# Patient Record
Sex: FEMALE | Race: WHITE | Employment: FULL TIME | URBAN - NONMETROPOLITAN AREA
[De-identification: names, ages, dates, MRNs, and addresses within clinical notes are randomized per-mention and may not be internally consistent; named-entity substitution may affect disease eponyms.]

---

## 2017-01-26 ENCOUNTER — TELEPHONE (OUTPATIENT)
Dept: CARDIOLOGY | Age: 47
End: 2017-01-26

## 2017-02-21 DIAGNOSIS — E78.5 HYPERLIPIDEMIA, UNSPECIFIED HYPERLIPIDEMIA TYPE: ICD-10-CM

## 2017-02-21 LAB
CHOLESTEROL, TOTAL: NORMAL MG/DL
CHOLESTEROL/HDL RATIO: NORMAL
HDLC SERPL-MCNC: NORMAL MG/DL (ref 35–70)
LDL CHOLESTEROL CALCULATED: 97 MG/DL (ref 0–160)
TRIGL SERPL-MCNC: NORMAL MG/DL
VLDLC SERPL CALC-MCNC: NORMAL MG/DL

## 2017-03-09 RX ORDER — ATORVASTATIN CALCIUM 40 MG/1
TABLET, FILM COATED ORAL
Qty: 30 TABLET | Refills: 4 | Status: SHIPPED | OUTPATIENT
Start: 2017-03-09 | End: 2017-11-08 | Stop reason: SDUPTHER

## 2017-03-10 ENCOUNTER — TELEPHONE (OUTPATIENT)
Dept: CARDIOLOGY | Age: 47
End: 2017-03-10

## 2017-09-26 RX ORDER — LISINOPRIL 2.5 MG/1
2.5 TABLET ORAL DAILY
Qty: 30 TABLET | Refills: 6 | Status: SHIPPED | OUTPATIENT
Start: 2017-09-26 | End: 2017-11-08 | Stop reason: SDUPTHER

## 2017-09-26 RX ORDER — METOPROLOL SUCCINATE 25 MG/1
25 TABLET, EXTENDED RELEASE ORAL DAILY
Qty: 30 TABLET | Refills: 6 | Status: SHIPPED | OUTPATIENT
Start: 2017-09-26 | End: 2017-11-08 | Stop reason: SDUPTHER

## 2017-11-08 ENCOUNTER — OFFICE VISIT (OUTPATIENT)
Dept: CARDIOLOGY CLINIC | Age: 47
End: 2017-11-08
Payer: COMMERCIAL

## 2017-11-08 VITALS
HEART RATE: 90 BPM | BODY MASS INDEX: 28.32 KG/M2 | WEIGHT: 150 LBS | SYSTOLIC BLOOD PRESSURE: 104 MMHG | DIASTOLIC BLOOD PRESSURE: 72 MMHG | HEIGHT: 61 IN

## 2017-11-08 DIAGNOSIS — I10 ESSENTIAL HYPERTENSION: ICD-10-CM

## 2017-11-08 DIAGNOSIS — I47.1 SVT (SUPRAVENTRICULAR TACHYCARDIA) (HCC): Primary | Chronic | ICD-10-CM

## 2017-11-08 DIAGNOSIS — I42.0 DILATED CARDIOMYOPATHY (HCC): ICD-10-CM

## 2017-11-08 DIAGNOSIS — E78.01 FAMILIAL HYPERCHOLESTEROLEMIA: ICD-10-CM

## 2017-11-08 PROCEDURE — 93000 ELECTROCARDIOGRAM COMPLETE: CPT | Performed by: NUCLEAR MEDICINE

## 2017-11-08 PROCEDURE — G8427 DOCREV CUR MEDS BY ELIG CLIN: HCPCS | Performed by: NUCLEAR MEDICINE

## 2017-11-08 PROCEDURE — G8419 CALC BMI OUT NRM PARAM NOF/U: HCPCS | Performed by: NUCLEAR MEDICINE

## 2017-11-08 PROCEDURE — 99213 OFFICE O/P EST LOW 20 MIN: CPT | Performed by: NUCLEAR MEDICINE

## 2017-11-08 PROCEDURE — 4004F PT TOBACCO SCREEN RCVD TLK: CPT | Performed by: NUCLEAR MEDICINE

## 2017-11-08 PROCEDURE — G8484 FLU IMMUNIZE NO ADMIN: HCPCS | Performed by: NUCLEAR MEDICINE

## 2017-11-08 RX ORDER — LISINOPRIL 2.5 MG/1
2.5 TABLET ORAL DAILY
Qty: 30 TABLET | Refills: 11 | Status: SHIPPED | OUTPATIENT
Start: 2017-11-08 | End: 2018-11-05 | Stop reason: SDUPTHER

## 2017-11-08 RX ORDER — ATORVASTATIN CALCIUM 40 MG/1
TABLET, FILM COATED ORAL
Qty: 30 TABLET | Refills: 11 | Status: SHIPPED | OUTPATIENT
Start: 2017-11-08 | End: 2018-11-05 | Stop reason: SDUPTHER

## 2017-11-08 RX ORDER — METOPROLOL SUCCINATE 25 MG/1
25 TABLET, EXTENDED RELEASE ORAL DAILY
Qty: 30 TABLET | Refills: 11 | Status: SHIPPED | OUTPATIENT
Start: 2017-11-08 | End: 2018-11-05 | Stop reason: SDUPTHER

## 2017-11-08 NOTE — PROGRESS NOTES
[facility] [address] [address]  Dept: [phone]  Dept Fax: [phone]  Loc: [phone]    Visit Date: 11/8/2017    Suman Blankenship is a 49 y.o. female who presents today for:  Chief Complaint   Patient presents with   Baron Rom     last seen in 9-2016, SVT    Cardiomyopathy    Hypertension    Hyperlipidemia     Cardiomyopathy  No new symptoms  BP is stable   Back to smoking   No dizziness  No syncope  No SVt episodes      HPI:  HPI  Past Medical History:   Diagnosis Date    Depression     DUB (dysfunctional uterine bleeding) 11/6/2013    Hyperlipidemia     Nicotine dependence     Palpitations     Snores     SVT (supraventricular tachycardia) ([facility] [place] [address]       Past Surgical History:   Procedure Laterality Date    CARDIOVASCULAR STRESS TEST  2 04 2011    Patient activated transmissions notable for an episode suggestive of re-entry  SVT w/ heart rates up to 190's. Remaining transmissions appeared consistent w/ either sinus rhythm or sinus tachycardia. Suki, 2001, 2004    X 3    ENDOMETRIAL ABLATION  11/6/13    PO [place]      TRANSTHORACIC ECHOCARDIOGRAM  2 04 2011    LV was mildly dilated. Systolic function mildly reduced. EF 45%. Mild diffuse hypokinesis.  Mild MR and TR.     TUBAL LIGATION  2004     Family History   Problem Relation Age of Onset    Cancer Mother     Hypertension Mother     Diabetes Father     Heart Disease Father     High Cholesterol Father     Heart Surgery Father      Bypass/surgery     Social History   Substance Use Topics    Smoking status: Current Every Day Smoker     Packs/day: 1.00     Years: 30.00     Types: Cigarettes    Smokeless tobacco: Never Used    Alcohol use No      Current Outpatient Prescriptions   Medication Sig Dispense Refill    lisinopril (PRINIVIL;ZESTRIL) 2.5 MG tablet Take 1 tablet by mouth daily 30 tablet 6    metoprolol succinate (TOPROL XL) 25 MG extended release tablet Take 1 tablet by mouth daily 30 tablet 6    atorvastatin (LIPITOR) 40 MG tablet TAKE 1 TABLET BY MOUTH ONE TIME A DAY  30 tablet 4     No current facility-administered medications for this visit. No Known Allergies  Health Maintenance   Topic Date Due    HIV screen  03/11/1984    DTaP/Tdap/Td vaccine (1 - Tdap) 03/11/1988    Pneumococcal med risk (1 of 1 - PPSV23) 03/11/1988    Cervical cancer screen  03/11/1990    Diabetes screen  03/11/2009    Flu vaccine (1) 09/01/2017    Lipid screen  02/20/2022       Subjective:  Review of Systems  General:   No fever, no chills, No fatigue or weight loss  Pulmonary:    No dyspnea, no wheezing  Cardiac:    Denies recent chest pain,   GI:     No nausea or vomiting, no abdominal pain  Neuro:    No dizziness or light headedness,   Musculoskeletal:  No recent active issues  Extremities:   No edema, good peripheral pulses      Objective:  Physical Exam  /72   Pulse 90   Ht 5' 1\" (1.549 m)   Wt 150 lb (68 kg)   BMI 28.34 kg/m²   General:   Well developed, well nourished  Lungs:   Clear to auscultation  Heart:    Normal S1 S2, Slight murmur. no rubs, no gallops  Abdomen:   Soft, non tender, no organomegalies, positive bowel sounds  Extremities:   No edema, no cyanosis, good peripheral pulses  Neurological:   Awake, alert, oriented. No obvious focal deficits  Musculoskelatal:  No obvious deformities    Assessment:     1. SVT (supraventricular tachycardia) (HCC)  EKG 12 Lead   2. Essential hypertension     3. Familial hypercholesterolemia     4. Dilated cardiomyopathy ([address]     cardiac fair for now  ECG in office was done today. I reviewed the ECG. No acute findings      Plan:  No Follow-up on file. Consider an echo   Continue risk factor modification and medical management  Thank you for allowing me to participate in the care of your patient.  Please don't hesitate to contact me regarding any further issues related to the patient

## 2017-11-08 NOTE — PROGRESS NOTES
Pt here has not been seen since 9-2016    Denies chest pain, sob, dizziness, edema, and palpitations

## 2018-03-23 ENCOUNTER — OFFICE VISIT (OUTPATIENT)
Dept: FAMILY MEDICINE CLINIC | Age: 48
End: 2018-03-23

## 2018-03-23 VITALS
RESPIRATION RATE: 14 BRPM | SYSTOLIC BLOOD PRESSURE: 112 MMHG | HEIGHT: 61 IN | BODY MASS INDEX: 28.55 KG/M2 | WEIGHT: 151.2 LBS | HEART RATE: 84 BPM | DIASTOLIC BLOOD PRESSURE: 70 MMHG

## 2018-03-23 DIAGNOSIS — I47.1 SVT (SUPRAVENTRICULAR TACHYCARDIA) (HCC): ICD-10-CM

## 2018-03-23 DIAGNOSIS — E78.01 FAMILIAL HYPERCHOLESTEROLEMIA: ICD-10-CM

## 2018-03-23 DIAGNOSIS — I10 ESSENTIAL HYPERTENSION: Primary | ICD-10-CM

## 2018-03-23 PROCEDURE — 99396 PREV VISIT EST AGE 40-64: CPT | Performed by: FAMILY MEDICINE

## 2018-03-23 ASSESSMENT — ENCOUNTER SYMPTOMS
CHEST TIGHTNESS: 0
SHORTNESS OF BREATH: 0
NAUSEA: 0
CONSTIPATION: 0
DIARRHEA: 0
ABDOMINAL PAIN: 0
COUGH: 0
EYES NEGATIVE: 1
VOMITING: 0

## 2018-03-23 ASSESSMENT — PATIENT HEALTH QUESTIONNAIRE - PHQ9
SUM OF ALL RESPONSES TO PHQ9 QUESTIONS 1 & 2: 0
SUM OF ALL RESPONSES TO PHQ QUESTIONS 1-9: 0
1. LITTLE INTEREST OR PLEASURE IN DOING THINGS: 0

## 2018-09-25 ENCOUNTER — TELEPHONE (OUTPATIENT)
Dept: CARDIOLOGY CLINIC | Age: 48
End: 2018-09-25

## 2018-09-25 DIAGNOSIS — R00.2 PALPITATIONS: Chronic | ICD-10-CM

## 2018-09-25 DIAGNOSIS — I47.1 SVT (SUPRAVENTRICULAR TACHYCARDIA) (HCC): Primary | Chronic | ICD-10-CM

## 2018-10-29 ENCOUNTER — HOSPITAL ENCOUNTER (OUTPATIENT)
Dept: NON INVASIVE DIAGNOSTICS | Age: 48
Discharge: HOME | End: 2018-10-29
Payer: COMMERCIAL

## 2018-10-29 LAB
LV EF: 53 %
LVEF MODALITY: NORMAL

## 2018-10-29 PROCEDURE — 93306 TTE W/DOPPLER COMPLETE: CPT

## 2018-11-05 ENCOUNTER — OFFICE VISIT (OUTPATIENT)
Dept: CARDIOLOGY CLINIC | Age: 48
End: 2018-11-05
Payer: COMMERCIAL

## 2018-11-05 VITALS
DIASTOLIC BLOOD PRESSURE: 68 MMHG | HEART RATE: 107 BPM | BODY MASS INDEX: 28.89 KG/M2 | WEIGHT: 153 LBS | HEIGHT: 61 IN | SYSTOLIC BLOOD PRESSURE: 102 MMHG

## 2018-11-05 DIAGNOSIS — F17.200 SMOKING: ICD-10-CM

## 2018-11-05 DIAGNOSIS — I47.1 SVT (SUPRAVENTRICULAR TACHYCARDIA) (HCC): Primary | Chronic | ICD-10-CM

## 2018-11-05 DIAGNOSIS — E78.5 HYPERLIPIDEMIA, UNSPECIFIED HYPERLIPIDEMIA TYPE: Chronic | ICD-10-CM

## 2018-11-05 DIAGNOSIS — I42.0 DILATED CARDIOMYOPATHY (HCC): ICD-10-CM

## 2018-11-05 PROCEDURE — G8484 FLU IMMUNIZE NO ADMIN: HCPCS | Performed by: NUCLEAR MEDICINE

## 2018-11-05 PROCEDURE — G8427 DOCREV CUR MEDS BY ELIG CLIN: HCPCS | Performed by: NUCLEAR MEDICINE

## 2018-11-05 PROCEDURE — 4004F PT TOBACCO SCREEN RCVD TLK: CPT | Performed by: NUCLEAR MEDICINE

## 2018-11-05 PROCEDURE — 99213 OFFICE O/P EST LOW 20 MIN: CPT | Performed by: NUCLEAR MEDICINE

## 2018-11-05 PROCEDURE — 93000 ELECTROCARDIOGRAM COMPLETE: CPT | Performed by: NUCLEAR MEDICINE

## 2018-11-05 PROCEDURE — G8419 CALC BMI OUT NRM PARAM NOF/U: HCPCS | Performed by: NUCLEAR MEDICINE

## 2018-11-05 RX ORDER — LISINOPRIL 2.5 MG/1
2.5 TABLET ORAL DAILY
Qty: 30 TABLET | Refills: 11 | Status: SHIPPED | OUTPATIENT
Start: 2018-11-05 | End: 2019-11-16 | Stop reason: SDUPTHER

## 2018-11-05 RX ORDER — ATORVASTATIN CALCIUM 40 MG/1
TABLET, FILM COATED ORAL
Qty: 30 TABLET | Refills: 11 | Status: SHIPPED | OUTPATIENT
Start: 2018-11-05 | End: 2019-11-16 | Stop reason: SDUPTHER

## 2018-11-05 RX ORDER — METOPROLOL SUCCINATE 25 MG/1
25 TABLET, EXTENDED RELEASE ORAL DAILY
Qty: 30 TABLET | Refills: 11 | Status: SHIPPED | OUTPATIENT
Start: 2018-11-05 | End: 2019-11-16 | Stop reason: SDUPTHER

## 2018-11-05 NOTE — PROGRESS NOTES
[address] [facility] CARDIOLOGY  [facility] [address]  [place] [address]  Dept: [phone]  Dept Fax: [phone]  Loc: [phone]    Visit Date: 11/5/2018    Dennys Blas is a 51 y.o. female who presents todayfor:  Chief Complaint   Patient presents with    Check-Up    Cardiomyopathy    Nicotine Dependence    Tachycardia    Hyperlipidemia   improved CMP   Last echo Ef 55%  No chest pain  No changes in breathing  Still smoking   On stations  SVt is stable         HPI:  HPI  Past Medical History:   Diagnosis Date    Depression     DUB (dysfunctional uterine bleeding) 11/6/2013    Hyperlipidemia     Nicotine dependence     Palpitations     Snores     SVT (supraventricular tachycardia) ([facility] [address]       Past Surgical History:   Procedure Laterality Date    CARDIOVASCULAR STRESS TEST  2 04 2011    Patient activated transmissions notable for an episode suggestive of re-entry  SVT w/ heart rates up to 190's. Remaining transmissions appeared consistent w/ either sinus rhythm or sinus tachycardia. Suki, 2001, 2004    X 3    ENDOMETRIAL ABLATION  11/6/13    PO [place]ANKLIN      TRANSTHORACIC ECHOCARDIOGRAM  2 04 2011    LV was mildly dilated. Systolic function mildly reduced. EF 45%. Mild diffuse hypokinesis.  Mild MR and TR.     TUBAL LIGATION  2004     Family History   Problem Relation Age of Onset    Cancer Mother     Hypertension Mother     Diabetes Father     Heart Disease Father     High Cholesterol Father     Heart Surgery Father         Bypass/surgery     Social History   Substance Use Topics    Smoking status: Current Every Day Smoker     Packs/day: 1.00     Years: 30.00     Types: Cigarettes    Smokeless tobacco: Never Used    Alcohol use No      Current Outpatient Prescriptions   Medication Sig Dispense Refill    lisinopril (PRINIVIL;ZESTRIL) 2.5 MG tablet Take 1 tablet by mouth daily 30 tablet 11    metoprolol succinate (TOPROL XL) 25 MG extended release tablet Take 1 tablet by mouth daily 30 tablet 11    atorvastatin (LIPITOR) 40 MG tablet TAKE 1 TABLET BY MOUTH ONE TIME A DAY 30 tablet 11     No current facility-administered medications for this visit. No Known Allergies  Health Maintenance   Topic Date Due    Potassium monitoring  1969    Creatinine monitoring  1969    HIV screen  03/11/1984    DTaP/Tdap/Td vaccine (1 - Tdap) 03/11/1988    Pneumococcal med risk (1 of 1 - PPSV23) 03/11/1988    Cervical cancer screen  03/11/1990    Diabetes screen  03/11/2009    Flu vaccine (1) 09/01/2018    Lipid screen  02/20/2022       Subjective:  Review of Systems  General:   No fever, no chills, No fatigue or weight loss  Pulmonary:    No dyspnea, no wheezing  Cardiac:    Denies recent chest pain,   GI:     No nausea or vomiting, no abdominal pain  Neuro:     No dizziness or light headedness,   Musculoskeletal:  No recent active issues  Extremities:   No edema, good peripheral pulses      Objective:  Physical Exam  /68   Pulse 107   Ht 5' 1\" (1.549 m)   Wt 153 lb (69.4 kg)   BMI 28.91 kg/m²   General:   Well developed, well nourished  Lungs:    Clear to auscultation  Heart:    Normal S1 S2, Slight murmur. no rubs, no gallops  Abdomen:   Soft, non tender, no organomegalies, positive bowel sounds  Extremities:   No edema, no cyanosis, good peripheral pulses  Neurological:   Awake, alert, oriented. No obvious focal deficits  Musculoskelatal:  No obvious deformities    Assessment:      Diagnosis Orders   1. SVT (supraventricular tachycardia) (HCC)  EKG 12 lead   2. Hyperlipidemia, unspecified hyperlipidemia type  EKG 12 lead   3. Dilated cardiomyopathy ([address]     4. Smoking     cardiac fair for now   Need to watch the HR  ECG in office was done today. I reviewed the ECG. No acute findings      Plan:  No Follow-up on file.   As above  Exercise  Continue risk factor modification and medical management  Thank you

## 2018-11-19 ENCOUNTER — TELEPHONE (OUTPATIENT)
Dept: FAMILY MEDICINE CLINIC | Age: 48
End: 2018-11-19

## 2019-08-26 ENCOUNTER — OFFICE VISIT (OUTPATIENT)
Dept: FAMILY MEDICINE CLINIC | Age: 49
End: 2019-08-26

## 2019-08-26 ENCOUNTER — HOSPITAL ENCOUNTER (OUTPATIENT)
Dept: WOMENS IMAGING | Age: 49
Discharge: HOME | End: 2019-08-26
Payer: COMMERCIAL

## 2019-08-26 VITALS
BODY MASS INDEX: 29.83 KG/M2 | DIASTOLIC BLOOD PRESSURE: 74 MMHG | SYSTOLIC BLOOD PRESSURE: 120 MMHG | HEIGHT: 61 IN | RESPIRATION RATE: 16 BRPM | HEART RATE: 72 BPM | WEIGHT: 158 LBS

## 2019-08-26 DIAGNOSIS — K59.00 CONSTIPATION, UNSPECIFIED CONSTIPATION TYPE: ICD-10-CM

## 2019-08-26 DIAGNOSIS — Z12.31 VISIT FOR SCREENING MAMMOGRAM: ICD-10-CM

## 2019-08-26 DIAGNOSIS — I47.1 SVT (SUPRAVENTRICULAR TACHYCARDIA) (HCC): ICD-10-CM

## 2019-08-26 DIAGNOSIS — I10 ESSENTIAL HYPERTENSION: Primary | ICD-10-CM

## 2019-08-26 DIAGNOSIS — E78.5 HYPERLIPIDEMIA, UNSPECIFIED HYPERLIPIDEMIA TYPE: ICD-10-CM

## 2019-08-26 LAB
HEMOCCULT STL QL: NEGATIVE
HEMOCCULT STL QL: NORMAL
HEMOCCULT STL QL: NORMAL

## 2019-08-26 PROCEDURE — 1036F TOBACCO NON-USER: CPT | Performed by: FAMILY MEDICINE

## 2019-08-26 PROCEDURE — 77063 BREAST TOMOSYNTHESIS BI: CPT

## 2019-08-26 PROCEDURE — G8419 CALC BMI OUT NRM PARAM NOF/U: HCPCS | Performed by: FAMILY MEDICINE

## 2019-08-26 PROCEDURE — 3017F COLORECTAL CA SCREEN DOC REV: CPT | Performed by: FAMILY MEDICINE

## 2019-08-26 PROCEDURE — 82270 OCCULT BLOOD FECES: CPT | Performed by: FAMILY MEDICINE

## 2019-08-26 PROCEDURE — G8427 DOCREV CUR MEDS BY ELIG CLIN: HCPCS | Performed by: FAMILY MEDICINE

## 2019-08-26 PROCEDURE — 99214 OFFICE O/P EST MOD 30 MIN: CPT | Performed by: FAMILY MEDICINE

## 2019-08-26 ASSESSMENT — ENCOUNTER SYMPTOMS
SHORTNESS OF BREATH: 0
ABDOMINAL PAIN: 0
COUGH: 0
CHEST TIGHTNESS: 0
CONSTIPATION: 1
NAUSEA: 0
DIARRHEA: 0
VOMITING: 0
EYES NEGATIVE: 1

## 2019-08-26 NOTE — PROGRESS NOTES
Father     Heart Surgery Father         Bypass/surgery     Subjective:     Review of Systems   Constitutional: Negative for activity change, appetite change, diaphoresis, fatigue and fever. HENT: Negative. Eyes: Negative. Respiratory: Negative for cough, chest tightness and shortness of breath. Cardiovascular: Negative for chest pain, palpitations and leg swelling. Gastrointestinal: Positive for constipation. Negative for abdominal pain, diarrhea, nausea and vomiting. Genitourinary: Negative. Musculoskeletal: Negative. Skin: Negative. Negative for rash. Neurological: Negative for dizziness, syncope, weakness, light-headedness, numbness and headaches. Psychiatric/Behavioral: Negative.        :   /74 (Site: Left Upper Arm, Position: Sitting, Cuff Size: Medium Adult)   Pulse 72   Resp 16   Ht 5' 1\" (1.549 m)   Wt 158 lb (71.7 kg)   BMI 29.85 kg/m²   Wt Readings from Last 3 Encounters:   08/26/19 158 lb (71.7 kg)   11/05/18 153 lb (69.4 kg)   03/23/18 151 lb 3.2 oz (68.6 kg)     Physical Exam   Constitutional: She is oriented to person, place, and time. She appears well-developed and well-nourished. No distress. HENT:   Head: Normocephalic and atraumatic. Eyes: Pupils are equal, round, and reactive to light. Conjunctivae and EOM are normal. Right eye exhibits no discharge. Left eye exhibits no discharge. No scleral icterus. Neck: Normal range of motion. Neck supple. No JVD present. No thyromegaly present. Cardiovascular: Normal rate, regular rhythm and normal heart sounds. No murmur heard. Pulmonary/Chest: Breath sounds normal. No respiratory distress. She has no wheezes. She has no rhonchi. She has no rales. Abdominal: Soft. Bowel sounds are normal. She exhibits no distension and no mass. There is no hepatosplenomegaly. There is no rebound and no guarding. Area that she gets the tenderness when increased intraabdominal pressure.  Possible hernia but not well

## 2019-08-27 LAB
CHOLESTEROL, TOTAL: NORMAL MG/DL
CHOLESTEROL/HDL RATIO: NORMAL
HDLC SERPL-MCNC: NORMAL MG/DL (ref 35–70)
LDL CHOLESTEROL CALCULATED: 100 MG/DL (ref 0–160)
TRIGL SERPL-MCNC: NORMAL MG/DL
VLDLC SERPL CALC-MCNC: NORMAL MG/DL

## 2019-08-29 DIAGNOSIS — I10 ESSENTIAL HYPERTENSION: ICD-10-CM

## 2019-08-29 DIAGNOSIS — E78.5 HYPERLIPIDEMIA, UNSPECIFIED HYPERLIPIDEMIA TYPE: ICD-10-CM

## 2019-10-08 ENCOUNTER — TELEPHONE (OUTPATIENT)
Dept: FAMILY MEDICINE CLINIC | Age: 49
End: 2019-10-08

## 2019-10-08 RX ORDER — AMOXICILLIN 500 MG/1
500 CAPSULE ORAL 3 TIMES DAILY
Qty: 30 CAPSULE | Refills: 0 | Status: SHIPPED | OUTPATIENT
Start: 2019-10-08 | End: 2019-10-18

## 2019-10-28 ENCOUNTER — OFFICE VISIT (OUTPATIENT)
Dept: FAMILY MEDICINE CLINIC | Age: 49
End: 2019-10-28

## 2019-10-28 VITALS
HEIGHT: 61 IN | DIASTOLIC BLOOD PRESSURE: 70 MMHG | RESPIRATION RATE: 12 BRPM | BODY MASS INDEX: 29.85 KG/M2 | SYSTOLIC BLOOD PRESSURE: 120 MMHG | HEART RATE: 72 BPM

## 2019-10-28 DIAGNOSIS — E78.5 HYPERLIPIDEMIA, UNSPECIFIED HYPERLIPIDEMIA TYPE: ICD-10-CM

## 2019-10-28 DIAGNOSIS — R43.2 DYSGEUSIA: ICD-10-CM

## 2019-10-28 DIAGNOSIS — I10 ESSENTIAL HYPERTENSION: Primary | ICD-10-CM

## 2019-10-28 PROCEDURE — 1036F TOBACCO NON-USER: CPT | Performed by: FAMILY MEDICINE

## 2019-10-28 PROCEDURE — G8419 CALC BMI OUT NRM PARAM NOF/U: HCPCS | Performed by: FAMILY MEDICINE

## 2019-10-28 PROCEDURE — 3017F COLORECTAL CA SCREEN DOC REV: CPT | Performed by: FAMILY MEDICINE

## 2019-10-28 PROCEDURE — G8427 DOCREV CUR MEDS BY ELIG CLIN: HCPCS | Performed by: FAMILY MEDICINE

## 2019-10-28 PROCEDURE — 99213 OFFICE O/P EST LOW 20 MIN: CPT | Performed by: FAMILY MEDICINE

## 2019-10-28 ASSESSMENT — ENCOUNTER SYMPTOMS
CHEST TIGHTNESS: 0
EYES NEGATIVE: 1
COUGH: 0
DIARRHEA: 0
CONSTIPATION: 0
NAUSEA: 0
ABDOMINAL PAIN: 0
VOMITING: 0
SHORTNESS OF BREATH: 0

## 2019-10-29 LAB
ABSOLUTE BASO #: 0.1 X10E9/L (ref 0–0.9)
ABSOLUTE EOS #: 0.1 X10E9/L (ref 0–0.4)
ABSOLUTE LYMPH #: 2 X10E9/L (ref 1–3.5)
ABSOLUTE MONO #: 0.5 X10E9/L (ref 0–0.9)
ABSOLUTE NEUT #: 3.4 X10E9/L (ref 1.5–6.6)
ALBUMIN SERPL-MCNC: 4.5 G/DL (ref 3.2–5.3)
ALK PHOSPHATASE: 63 U/L (ref 39–130)
ALT SERPL-CCNC: 14 U/L (ref 0–31)
ANION GAP SERPL CALCULATED.3IONS-SCNC: 8 MMOL/L (ref 4–12)
ANTI-NUCLEAR ANTIBODY (ANA): NEGATIVE
AST SERPL-CCNC: 15 U/L (ref 0–41)
BASOPHILS RELATIVE PERCENT: 1 %
BILIRUB SERPL-MCNC: 0.4 MG/DL (ref 0.3–1.2)
BUN BLDV-MCNC: 11 MG/DL (ref 5–23)
CALCIUM SERPL-MCNC: 9.9 MG/DL (ref 8.5–10.5)
CHLORIDE BLD-SCNC: 105 MMOL/L (ref 98–109)
CO2: 29 MMOL/L (ref 22–32)
CREAT SERPL-MCNC: 0.88 MG/DL (ref 0.4–1)
EGFR AFRICAN AMERICAN: >60 ML/MIN/1.73SQ.M
EGFR IF NONAFRICAN AMERICAN: >60 ML/MIN/1.73SQ.M
EOSINOPHILS RELATIVE PERCENT: 2.2 %
FOLATE: 14.6 NG/ML
GLUCOSE: 92 MG/DL (ref 65–99)
HCT VFR BLD CALC: 43.7 % (ref 35–47)
HEMOGLOBIN: 14.4 G/DL (ref 11.7–16)
LYMPHOCYTE %: 32 %
MCH RBC QN AUTO: 29.8 PG (ref 26–33.5)
MCHC RBC AUTO-ENTMCNC: 32.9 G/DL (ref 32–36)
MCV RBC AUTO: 90 FL (ref 81–100)
MONOCYTES # BLD: 9 %
NEUTROPHILS RELATIVE PERCENT: 55.8 %
PDW BLD-RTO: 14 % (ref 11.5–14.7)
PLATELETS: 283 X10E9/L (ref 150–450)
PMV BLD AUTO: 7.6 FL (ref 7–12)
POTASSIUM SERPL-SCNC: 4.4 MMOL/L (ref 3.5–5)
RBC: 4.83 X10E12/L (ref 3.8–5.2)
RHEUMATOID FACTOR: <10 IU/ML
SEDIMENTATION RATE, ERYTHROCYTE: 8 MM/H (ref 0–30)
SODIUM BLD-SCNC: 142 MMOL/L (ref 134–146)
TOTAL PROTEIN: 6.8 G/DL (ref 6–8)
TSH SERPL DL<=0.05 MIU/L-ACNC: 1.92 UIU/ML (ref 0.49–4.67)
VITAMIN B-12: 303 PG/ML (ref 180–914)
WBC: 6.1 X10E9/L (ref 4.8–10.8)

## 2019-11-08 ENCOUNTER — TELEPHONE (OUTPATIENT)
Dept: FAMILY MEDICINE CLINIC | Age: 49
End: 2019-11-08

## 2019-11-08 ENCOUNTER — OFFICE VISIT (OUTPATIENT)
Dept: FAMILY MEDICINE CLINIC | Age: 49
End: 2019-11-08

## 2019-11-08 VITALS
SYSTOLIC BLOOD PRESSURE: 116 MMHG | BODY MASS INDEX: 30.73 KG/M2 | DIASTOLIC BLOOD PRESSURE: 70 MMHG | RESPIRATION RATE: 16 BRPM | HEIGHT: 61 IN | WEIGHT: 162.8 LBS | HEART RATE: 58 BPM | TEMPERATURE: 98.2 F

## 2019-11-08 DIAGNOSIS — R43.2 DYSGEUSIA: ICD-10-CM

## 2019-11-08 DIAGNOSIS — H92.03: ICD-10-CM

## 2019-11-08 DIAGNOSIS — I10 ESSENTIAL HYPERTENSION: Primary | ICD-10-CM

## 2019-11-08 PROCEDURE — G8427 DOCREV CUR MEDS BY ELIG CLIN: HCPCS | Performed by: FAMILY MEDICINE

## 2019-11-08 PROCEDURE — 3017F COLORECTAL CA SCREEN DOC REV: CPT | Performed by: FAMILY MEDICINE

## 2019-11-08 PROCEDURE — G8417 CALC BMI ABV UP PARAM F/U: HCPCS | Performed by: FAMILY MEDICINE

## 2019-11-08 PROCEDURE — 99214 OFFICE O/P EST MOD 30 MIN: CPT | Performed by: FAMILY MEDICINE

## 2019-11-08 PROCEDURE — 1036F TOBACCO NON-USER: CPT | Performed by: FAMILY MEDICINE

## 2019-11-08 RX ORDER — OMEPRAZOLE 40 MG/1
CAPSULE, DELAYED RELEASE ORAL
Refills: 2 | COMMUNITY
Start: 2019-10-28 | End: 2022-02-16

## 2019-11-08 RX ORDER — AMOXICILLIN AND CLAVULANATE POTASSIUM 875; 125 MG/1; MG/1
1 TABLET, FILM COATED ORAL 2 TIMES DAILY
Qty: 20 TABLET | Refills: 0 | Status: SHIPPED | OUTPATIENT
Start: 2019-11-08 | End: 2019-11-18

## 2019-11-10 ASSESSMENT — ENCOUNTER SYMPTOMS
CONSTIPATION: 0
VOMITING: 0
DIARRHEA: 0
ABDOMINAL PAIN: 0
COUGH: 0
SHORTNESS OF BREATH: 0
NAUSEA: 0
CHEST TIGHTNESS: 0
EYES NEGATIVE: 1

## 2019-11-11 ENCOUNTER — OFFICE VISIT (OUTPATIENT)
Dept: CARDIOLOGY CLINIC | Age: 49
End: 2019-11-11
Payer: COMMERCIAL

## 2019-11-11 VITALS
WEIGHT: 162 LBS | DIASTOLIC BLOOD PRESSURE: 68 MMHG | SYSTOLIC BLOOD PRESSURE: 122 MMHG | BODY MASS INDEX: 30.58 KG/M2 | HEIGHT: 61 IN | HEART RATE: 60 BPM

## 2019-11-11 DIAGNOSIS — I42.0 DILATED CARDIOMYOPATHY (HCC): ICD-10-CM

## 2019-11-11 DIAGNOSIS — I10 ESSENTIAL HYPERTENSION: ICD-10-CM

## 2019-11-11 DIAGNOSIS — E78.01 FAMILIAL HYPERCHOLESTEROLEMIA: ICD-10-CM

## 2019-11-11 DIAGNOSIS — I47.1 SVT (SUPRAVENTRICULAR TACHYCARDIA) (HCC): Primary | ICD-10-CM

## 2019-11-11 PROCEDURE — G8417 CALC BMI ABV UP PARAM F/U: HCPCS | Performed by: NUCLEAR MEDICINE

## 2019-11-11 PROCEDURE — 99213 OFFICE O/P EST LOW 20 MIN: CPT | Performed by: NUCLEAR MEDICINE

## 2019-11-11 PROCEDURE — G8427 DOCREV CUR MEDS BY ELIG CLIN: HCPCS | Performed by: NUCLEAR MEDICINE

## 2019-11-11 PROCEDURE — 1036F TOBACCO NON-USER: CPT | Performed by: NUCLEAR MEDICINE

## 2019-11-11 PROCEDURE — 3017F COLORECTAL CA SCREEN DOC REV: CPT | Performed by: NUCLEAR MEDICINE

## 2019-11-11 PROCEDURE — 93000 ELECTROCARDIOGRAM COMPLETE: CPT | Performed by: NUCLEAR MEDICINE

## 2019-11-11 PROCEDURE — G8482 FLU IMMUNIZE ORDER/ADMIN: HCPCS | Performed by: NUCLEAR MEDICINE

## 2019-11-12 ENCOUNTER — TELEPHONE (OUTPATIENT)
Dept: FAMILY MEDICINE CLINIC | Age: 49
End: 2019-11-12

## 2019-11-18 RX ORDER — ATORVASTATIN CALCIUM 40 MG/1
TABLET, FILM COATED ORAL
Qty: 30 TABLET | Refills: 10 | Status: SHIPPED | OUTPATIENT
Start: 2019-11-18 | End: 2020-12-03

## 2019-11-18 RX ORDER — METOPROLOL SUCCINATE 25 MG/1
TABLET, EXTENDED RELEASE ORAL
Qty: 30 TABLET | Refills: 10 | Status: SHIPPED | OUTPATIENT
Start: 2019-11-18 | End: 2020-12-03

## 2019-11-18 RX ORDER — LISINOPRIL 2.5 MG/1
TABLET ORAL
Qty: 30 TABLET | Refills: 10 | Status: SHIPPED | OUTPATIENT
Start: 2019-11-18 | End: 2020-12-03

## 2019-11-26 ENCOUNTER — TELEPHONE (OUTPATIENT)
Dept: ENT CLINIC | Age: 49
End: 2019-11-26

## 2019-12-12 ENCOUNTER — TELEPHONE (OUTPATIENT)
Dept: FAMILY MEDICINE CLINIC | Age: 49
End: 2019-12-12

## 2019-12-19 ENCOUNTER — APPOINTMENT (OUTPATIENT)
Dept: GENERAL RADIOLOGY | Age: 49
End: 2019-12-19
Payer: COMMERCIAL

## 2019-12-19 ENCOUNTER — HOSPITAL ENCOUNTER (EMERGENCY)
Age: 49
Discharge: HOME | End: 2019-12-19
Attending: EMERGENCY MEDICINE
Payer: COMMERCIAL

## 2019-12-19 VITALS
WEIGHT: 162 LBS | BODY MASS INDEX: 30.61 KG/M2 | DIASTOLIC BLOOD PRESSURE: 82 MMHG | OXYGEN SATURATION: 99 % | TEMPERATURE: 98.7 F | HEART RATE: 86 BPM | RESPIRATION RATE: 18 BRPM | SYSTOLIC BLOOD PRESSURE: 125 MMHG

## 2019-12-19 DIAGNOSIS — R00.2 PALPITATIONS: Primary | ICD-10-CM

## 2019-12-19 DIAGNOSIS — F43.0 ACUTE REACTION TO SITUATIONAL STRESS: ICD-10-CM

## 2019-12-19 LAB
ALBUMIN SERPL-MCNC: 4.3 G/DL (ref 3.5–5.1)
ALP BLD-CCNC: 72 U/L (ref 38–126)
ALT SERPL-CCNC: 12 U/L (ref 11–66)
ANION GAP SERPL CALCULATED.3IONS-SCNC: 10 MEQ/L (ref 8–16)
AST SERPL-CCNC: 17 U/L (ref 5–40)
BASOPHILS # BLD: 1 %
BASOPHILS ABSOLUTE: 0.1 THOU/MM3 (ref 0–0.1)
BILIRUB SERPL-MCNC: < 0.2 MG/DL (ref 0.3–1.2)
BUN BLDV-MCNC: 12 MG/DL (ref 7–22)
CALCIUM SERPL-MCNC: 9.8 MG/DL (ref 8.5–10.5)
CHLORIDE BLD-SCNC: 104 MEQ/L (ref 98–111)
CO2: 26 MEQ/L (ref 23–33)
CREAT SERPL-MCNC: 0.6 MG/DL (ref 0.4–1.2)
D-DIMER QUANTITATIVE: 352 NG/ML FEU (ref 0–500)
EOSINOPHIL # BLD: 2 %
EOSINOPHILS ABSOLUTE: 0.2 THOU/MM3 (ref 0–0.4)
ERYTHROCYTE [DISTWIDTH] IN BLOOD BY AUTOMATED COUNT: 13.7 % (ref 11.5–14.5)
ERYTHROCYTE [DISTWIDTH] IN BLOOD BY AUTOMATED COUNT: 46.4 FL (ref 35–45)
GFR SERPL CREATININE-BSD FRML MDRD: > 90 ML/MIN/1.73M2
GLUCOSE BLD-MCNC: 87 MG/DL (ref 70–108)
HCT VFR BLD CALC: 45.4 % (ref 37–47)
HEMOGLOBIN: 14.6 GM/DL (ref 12–16)
IMMATURE GRANS (ABS): 0.06 THOU/MM3 (ref 0–0.07)
IMMATURE GRANULOCYTES: 0.7 %
LYMPHOCYTES # BLD: 33.2 %
LYMPHOCYTES ABSOLUTE: 2.7 THOU/MM3 (ref 1–4.8)
MCH RBC QN AUTO: 29.6 PG (ref 26–33)
MCHC RBC AUTO-ENTMCNC: 32.2 GM/DL (ref 32.2–35.5)
MCV RBC AUTO: 92.1 FL (ref 81–99)
MONOCYTES # BLD: 7.7 %
MONOCYTES ABSOLUTE: 0.6 THOU/MM3 (ref 0.4–1.3)
NUCLEATED RED BLOOD CELLS: 0 /100 WBC
OSMOLALITY CALCULATION: 278.5 MOSMOL/KG (ref 275–300)
PLATELET # BLD: 261 THOU/MM3 (ref 130–400)
PMV BLD AUTO: 8.6 FL (ref 9.4–12.4)
POTASSIUM REFLEX MAGNESIUM: 3.9 MEQ/L (ref 3.5–5.2)
PRO-BNP: 40.1 PG/ML (ref 0–900)
RBC # BLD: 4.93 MILL/MM3 (ref 4.2–5.4)
SEG NEUTROPHILS: 55.4 %
SEGMENTED NEUTROPHILS ABSOLUTE COUNT: 4.5 THOU/MM3 (ref 1.8–7.7)
SODIUM BLD-SCNC: 140 MEQ/L (ref 135–145)
TOTAL PROTEIN: 7.2 G/DL (ref 6.1–8)
TROPONIN T: < 0.01 NG/ML
WBC # BLD: 8.1 THOU/MM3 (ref 4.8–10.8)

## 2019-12-19 PROCEDURE — 2580000003 HC RX 258: Performed by: EMERGENCY MEDICINE

## 2019-12-19 PROCEDURE — 83880 ASSAY OF NATRIURETIC PEPTIDE: CPT

## 2019-12-19 PROCEDURE — 71046 X-RAY EXAM CHEST 2 VIEWS: CPT

## 2019-12-19 PROCEDURE — 85025 COMPLETE CBC W/AUTO DIFF WBC: CPT

## 2019-12-19 PROCEDURE — 84484 ASSAY OF TROPONIN QUANT: CPT

## 2019-12-19 PROCEDURE — 85379 FIBRIN DEGRADATION QUANT: CPT

## 2019-12-19 PROCEDURE — 36415 COLL VENOUS BLD VENIPUNCTURE: CPT

## 2019-12-19 PROCEDURE — 93005 ELECTROCARDIOGRAM TRACING: CPT | Performed by: EMERGENCY MEDICINE

## 2019-12-19 PROCEDURE — 99285 EMERGENCY DEPT VISIT HI MDM: CPT

## 2019-12-19 PROCEDURE — 80053 COMPREHEN METABOLIC PANEL: CPT

## 2019-12-19 RX ORDER — 0.9 % SODIUM CHLORIDE 0.9 %
1000 INTRAVENOUS SOLUTION INTRAVENOUS ONCE
Status: COMPLETED | OUTPATIENT
Start: 2019-12-19 | End: 2019-12-19

## 2019-12-19 RX ADMIN — SODIUM CHLORIDE 1000 ML: 9 INJECTION, SOLUTION INTRAVENOUS at 21:24

## 2019-12-19 ASSESSMENT — ENCOUNTER SYMPTOMS
WHEEZING: 0
COUGH: 0
STRIDOR: 0
DIARRHEA: 0
RHINORRHEA: 0
VOMITING: 0
CONSTIPATION: 0
APNEA: 0
ABDOMINAL PAIN: 0
EYE PAIN: 0
CHEST TIGHTNESS: 0
NAUSEA: 0
CHOKING: 0
SORE THROAT: 0
SHORTNESS OF BREATH: 0

## 2019-12-20 LAB
EKG ATRIAL RATE: 101 BPM
EKG P AXIS: 59 DEGREES
EKG P-R INTERVAL: 170 MS
EKG Q-T INTERVAL: 328 MS
EKG QRS DURATION: 74 MS
EKG QTC CALCULATION (BAZETT): 425 MS
EKG R AXIS: 49 DEGREES
EKG T AXIS: 33 DEGREES
EKG VENTRICULAR RATE: 101 BPM

## 2019-12-20 PROCEDURE — 93010 ELECTROCARDIOGRAM REPORT: CPT | Performed by: NUCLEAR MEDICINE

## 2020-01-27 ENCOUNTER — TELEPHONE (OUTPATIENT)
Dept: FAMILY MEDICINE CLINIC | Age: 50
End: 2020-01-27

## 2020-01-27 ENCOUNTER — OFFICE VISIT (OUTPATIENT)
Dept: FAMILY MEDICINE CLINIC | Age: 50
End: 2020-01-27

## 2020-01-27 VITALS
RESPIRATION RATE: 16 BRPM | HEIGHT: 61 IN | BODY MASS INDEX: 29.83 KG/M2 | HEART RATE: 86 BPM | WEIGHT: 158 LBS | DIASTOLIC BLOOD PRESSURE: 68 MMHG | SYSTOLIC BLOOD PRESSURE: 108 MMHG

## 2020-01-27 PROCEDURE — 99213 OFFICE O/P EST LOW 20 MIN: CPT | Performed by: FAMILY MEDICINE

## 2020-01-27 RX ORDER — ESCITALOPRAM OXALATE 10 MG/1
10 TABLET ORAL DAILY
Qty: 30 TABLET | Refills: 1 | Status: SHIPPED | OUTPATIENT
Start: 2020-01-27 | End: 2022-02-16

## 2020-01-27 ASSESSMENT — ENCOUNTER SYMPTOMS
NAUSEA: 0
COUGH: 0
CHEST TIGHTNESS: 0
EYES NEGATIVE: 1
VOMITING: 0
CONSTIPATION: 0
ABDOMINAL PAIN: 0
DIARRHEA: 0
SHORTNESS OF BREATH: 0

## 2020-01-27 NOTE — TELEPHONE ENCOUNTER
Called patient and no answer. Unable to leave a message as mail box was full. Please try to call her tomorrow and explain to her that medication will start working but the full effect is what takes a little longer . Also remind her to call us with her psychology of preference.

## 2020-01-27 NOTE — PROGRESS NOTES
Date: 2020    Naya Gomez is a 47 y.o. female who presents today for:  Chief Complaint   Patient presents with    Anxiety       HPI:     Anxiety  Patient is here for evaluation of anxiety. She has the following anxiety symptoms: difficulty concentrating, irritable, palpitations, racing thoughts, she cries, she is sleeping too much, during the weekend she slept 17 hours, she started smoking again about 1 month ago and she is really upset about that. Onset of symptoms was approximately 1 1/2 months ago. Symptoms have been gradually worsening since that time. She denies current suicidal and homicidal ideation. Family history significant for no psychiatric illness. Possible organic causes contributing are: none. Risk factors: negative life event she is going thru a divorce after 32 year together because her  is an alcoholic and she is just tired of the situation. Her court day is 2020. She states that she loves him and if he wasn't a drinker she would not be getting . She states that about 7 years ago she filled for divorce and he promised her he was going to stop drinking but he didn't. And at this point she is just tired of it and she does not want to continue this way. He moved out of the house about 6 months ago. They have 3 children one in his 35s, 19y/o and 14 y/o all boys. The 2 youngest are living with her currently. Previous treatment includes medication Prozac about 7 year ago when she was trying to get the divorce as well. She complains of the following medication side effects: none. has a current medication list which includes the following prescription(s): escitalopram, metoprolol succinate, lisinopril, atorvastatin, and omeprazole.     No Known Allergies    Social History     Tobacco Use    Smoking status: Former Smoker     Packs/day: 1.00     Years: 30.00     Pack years: 30.00     Types: Cigarettes     Last attempt to quit: 2019     Years since quittin.5  Smokeless tobacco: Never Used   Substance Use Topics    Alcohol use: No    Drug use: No       Past Medical History:   Diagnosis Date    Depression     DUB (dysfunctional uterine bleeding) 11/6/2013    Hyperlipidemia     Nicotine dependence     Palpitations     Snores     SVT (supraventricular tachycardia) ([facility] [address]        Past Surgical History:   Procedure Laterality Date    CARDIOVASCULAR STRESS TEST  2 04 2011    Patient activated transmissions notable for an episode suggestive of re-entry  SVT w/ heart rates up to 190's. Remaining transmissions appeared consistent w/ either sinus rhythm or sinus tachycardia. Suki, 2001, 2004    X 3    ENDOMETRIAL ABLATION  11/6/13    PO FRANKLIN      TRANSTHORACIC ECHOCARDIOGRAM  2 04 2011    LV was mildly dilated. Systolic function mildly reduced. EF 45%. Mild diffuse hypokinesis. Mild MR and TR.     TUBAL LIGATION  2004       Family History   Problem Relation Age of Onset    Cancer Mother     Hypertension Mother     Diabetes Father     Heart Disease Father     High Cholesterol Father     Heart Surgery Father         Bypass/surgery     Subjective:     Review of Systems   Constitutional: Positive for appetite change (her appetite is decreased. ). Negative for activity change, diaphoresis, fatigue and fever. HENT: Negative. Eyes: Negative. Respiratory: Negative for cough, chest tightness and shortness of breath. Cardiovascular: Negative for chest pain, palpitations and leg swelling. Gastrointestinal: Negative for abdominal pain, constipation, diarrhea, nausea and vomiting. Genitourinary: Negative. Musculoskeletal: Negative. Skin: Negative. Negative for rash. Neurological: Negative for dizziness, syncope, weakness, light-headedness, numbness and headaches. Psychiatric/Behavioral: Positive for decreased concentration and sleep disturbance. Negative for suicidal ideas. The patient is nervous/anxious.         Ryan Hayes /68 (Site: Left Upper Arm, Position: Sitting, Cuff Size: Medium Adult)   Pulse 86   Resp 16   Ht 5' 1\" (1.549 m)   Wt 158 lb (71.7 kg)   BMI 29.85 kg/m²   Wt Readings from Last 3 Encounters:   01/27/20 158 lb (71.7 kg)   12/19/19 162 lb (73.5 kg)   11/11/19 162 lb (73.5 kg)     Physical Exam  Vitals signs and nursing note reviewed. Constitutional:       General: She is not in acute distress. Appearance: She is well-developed. She is not diaphoretic. HENT:      Head: Normocephalic and atraumatic. Eyes:      General: No scleral icterus. Right eye: No discharge. Left eye: No discharge. Conjunctiva/sclera: Conjunctivae normal.      Pupils: Pupils are equal, round, and reactive to light. Neck:      Musculoskeletal: Normal range of motion and neck supple. Thyroid: No thyromegaly. Vascular: No JVD. Cardiovascular:      Rate and Rhythm: Normal rate and regular rhythm. Heart sounds: Normal heart sounds. No murmur. Pulmonary:      Effort: No respiratory distress. Breath sounds: Normal breath sounds. No wheezing, rhonchi or rales. Abdominal:      General: Bowel sounds are normal. There is no distension. Palpations: Abdomen is soft. There is no mass. Tenderness: There is no abdominal tenderness. There is no guarding or rebound. Musculoskeletal: Normal range of motion. Lymphadenopathy:      Cervical: No cervical adenopathy. Skin:     General: Skin is warm and dry. Findings: No rash. Neurological:      Mental Status: She is alert and oriented to person, place, and time. Psychiatric:         Attention and Perception: Attention normal.         Mood and Affect: Mood is depressed. Affect is tearful. Speech: Speech normal.         Behavior: Behavior normal. Behavior is cooperative. Thought Content:  Thought content normal.         Cognition and Memory: Cognition normal.         Judgment: Judgment normal.       : Diagnosis Orders   1. SVT (supraventricular tachycardia) ([facility] [address]     2. Situational mixed anxiety and depressive disorder  escitalopram (LEXAPRO) 10 MG tablet       :      Requested Prescriptions     Signed Prescriptions Disp Refills    escitalopram (LEXAPRO) 10 MG tablet 30 tablet 1     Sig: Take 1 tablet by mouth daily     Current Outpatient Medications   Medication Sig Dispense Refill    escitalopram (LEXAPRO) 10 MG tablet Take 1 tablet by mouth daily 30 tablet 1    metoprolol succinate (TOPROL XL) 25 MG extended release tablet TAKE 1 TABLET BY MOUTH ONE TIME A DAY  30 tablet 10    lisinopril (PRINIVIL;ZESTRIL) 2.5 MG tablet TAKE 1 TABLET BY MOUTH ONE TIME A DAY  30 tablet 10    atorvastatin (LIPITOR) 40 MG tablet TAKE 1 TABLET BY MOUTH ONE TIME A DAY  30 tablet 10    omeprazole (PRILOSEC) 40 MG delayed release capsule TAKE 1 CAPSULE BY MOUTH ONE TIME A DAY  2     No current facility-administered medications for this visit. No orders of the defined types were placed in this encounter. Continue current medications. We'll start her on Lexapro. Also she needs to referral to psychologist she will check with her insurance and call us back with the preference so appointment can be made. We discussed importance of a psychologist referral for counseling. Return in about 2 weeks (around 2/10/2020). Discussed use, benefit, and side effects of prescribed medications. All patient questions answered. Pt voiced understanding. Instructed to continue current medications,diet and exercise. Patient agreed with treatment plan.

## 2020-02-14 NOTE — TELEPHONE ENCOUNTER
Spoke to patient she states she is doing good, he states that when she came to the office she was having a really rough week, she stated that she waited too long to come and things were just escalating. She says she feels much better now her divorce is final and she feels like her weight has been lifted. She feels that she does not need any intervention or anything at this time.   If things should change she will let us know

## 2020-09-24 ENCOUNTER — HOSPITAL ENCOUNTER (OUTPATIENT)
Dept: WOMENS IMAGING | Age: 50
Discharge: HOME | End: 2020-09-24
Payer: COMMERCIAL

## 2020-09-24 PROCEDURE — 77063 BREAST TOMOSYNTHESIS BI: CPT

## 2020-12-03 RX ORDER — ATORVASTATIN CALCIUM 40 MG/1
TABLET, FILM COATED ORAL
Qty: 30 TABLET | Refills: 2 | Status: SHIPPED | OUTPATIENT
Start: 2020-12-03 | End: 2021-02-10 | Stop reason: SDUPTHER

## 2020-12-03 RX ORDER — METOPROLOL SUCCINATE 25 MG/1
TABLET, EXTENDED RELEASE ORAL
Qty: 30 TABLET | Refills: 2 | Status: SHIPPED | OUTPATIENT
Start: 2020-12-03 | End: 2021-02-10 | Stop reason: SDUPTHER

## 2020-12-03 RX ORDER — LISINOPRIL 2.5 MG/1
TABLET ORAL
Qty: 30 TABLET | Refills: 2 | Status: SHIPPED | OUTPATIENT
Start: 2020-12-03 | End: 2021-02-10 | Stop reason: SDUPTHER

## 2021-02-10 ENCOUNTER — OFFICE VISIT (OUTPATIENT)
Dept: CARDIOLOGY CLINIC | Age: 51
End: 2021-02-10
Payer: COMMERCIAL

## 2021-02-10 VITALS
HEIGHT: 61 IN | BODY MASS INDEX: 29.27 KG/M2 | WEIGHT: 155 LBS | HEART RATE: 93 BPM | SYSTOLIC BLOOD PRESSURE: 122 MMHG | DIASTOLIC BLOOD PRESSURE: 80 MMHG

## 2021-02-10 DIAGNOSIS — I42.0 DILATED CARDIOMYOPATHY (HCC): ICD-10-CM

## 2021-02-10 DIAGNOSIS — I47.1 SVT (SUPRAVENTRICULAR TACHYCARDIA) (HCC): Primary | Chronic | ICD-10-CM

## 2021-02-10 PROCEDURE — 93000 ELECTROCARDIOGRAM COMPLETE: CPT | Performed by: NUCLEAR MEDICINE

## 2021-02-10 PROCEDURE — 99213 OFFICE O/P EST LOW 20 MIN: CPT | Performed by: NUCLEAR MEDICINE

## 2021-02-10 RX ORDER — METOPROLOL SUCCINATE 25 MG/1
TABLET, EXTENDED RELEASE ORAL
Qty: 90 TABLET | Refills: 3 | Status: SHIPPED | OUTPATIENT
Start: 2021-02-10 | End: 2022-02-24

## 2021-02-10 RX ORDER — ATORVASTATIN CALCIUM 40 MG/1
TABLET, FILM COATED ORAL
Qty: 90 TABLET | Refills: 3 | Status: SHIPPED | OUTPATIENT
Start: 2021-02-10 | End: 2022-05-11 | Stop reason: SDUPTHER

## 2021-02-10 RX ORDER — LISINOPRIL 2.5 MG/1
TABLET ORAL
Qty: 90 TABLET | Refills: 3 | Status: SHIPPED | OUTPATIENT
Start: 2021-02-10 | End: 2022-02-24

## 2021-02-10 NOTE — PROGRESS NOTES
[address]  [address]  [facility] [address]  Dept: [phone]  Dept Fax: [phone]  Loc: [phone]    Visit Date: 2/10/2021    Ade Ching is a 45 y.o. female who presents todayfor:  Chief Complaint   Patient presents with    Check-Up    Cardiomyopathy    Irregular Heart Beat    Hypertension   improved CMP   No chest pain   no changes in breathing  Higher Hr at times  No dizziness  No syncope  No known CAD      HPI:  HPI  Past Medical History:   Diagnosis Date    Depression     DUB (dysfunctional uterine bleeding) 2013    Hyperlipidemia     Nicotine dependence     Palpitations     Snores     SVT (supraventricular tachycardia) ([facility] [address]       Past Surgical History:   Procedure Laterality Date    CARDIOVASCULAR STRESS TEST  2011    Patient activated transmissions notable for an episode suggestive of re-entry  SVT w/ heart rates up to 190's. Remaining transmissions appeared consistent w/ either sinus rhythm or sinus tachycardia. Suki, , 2004    X 3    ENDOMETRIAL ABLATION  13    PO FRANKLIN      TRANSTHORACIC ECHOCARDIOGRAM  2011    LV was mildly dilated. Systolic function mildly reduced. EF 45%. Mild diffuse hypokinesis.  Mild MR and TR.     TUBAL LIGATION       Family History   Problem Relation Age of Onset    Cancer Mother     Hypertension Mother     Diabetes Father     Heart Disease Father     High Cholesterol Father     Heart Surgery Father         Bypass/surgery     Social History     Tobacco Use    Smoking status: Former Smoker     Packs/day: 1.00     Years: 30.00     Pack years: 30.00     Types: Cigarettes     Quit date: 2019     Years since quittin.5    Smokeless tobacco: Never Used   Substance Use Topics    Alcohol use: No      Current Outpatient Medications   Medication Sig Dispense Refill  atorvastatin (LIPITOR) 40 MG tablet TAKE 1 TABLET BY MOUTH ONE TIME A DAY  30 tablet 2    metoprolol succinate (TOPROL XL) 25 MG extended release tablet TAKE 1 TABLET BY MOUTH ONE TIME A DAY  30 tablet 2    lisinopril (PRINIVIL;ZESTRIL) 2.5 MG tablet TAKE 1 TABLET BY MOUTH ONE TIME A DAY  30 tablet 2    omeprazole (PRILOSEC) 40 MG delayed release capsule TAKE 1 CAPSULE BY MOUTH ONE TIME A DAY  2    escitalopram (LEXAPRO) 10 MG tablet Take 1 tablet by mouth daily 30 tablet 1     No current facility-administered medications for this visit. No Known Allergies  Health Maintenance   Topic Date Due    Hepatitis C screen  1969    HIV screen  03/11/1984    DTaP/Tdap/Td vaccine (1 - Tdap) 03/11/1988    Cervical cancer screen  03/11/1990    Shingles Vaccine (1 of 2) 03/11/2019    Lipid screen  08/27/2020    Flu vaccine (1) 09/01/2020    Potassium monitoring  12/19/2020    Creatinine monitoring  12/19/2020    Breast cancer screen  09/24/2022    Colon cancer screen colonoscopy  11/25/2029    Hepatitis A vaccine  Aged Out    Hepatitis B vaccine  Aged Out    Hib vaccine  Aged Out    Meningococcal (ACWY) vaccine  Aged Out    Pneumococcal 0-64 years Vaccine  Aged Out       Subjective:  Review of Systems  General:   No fever, no chills, No fatigue or weight loss  Pulmonary:    No dyspnea, no wheezing  Cardiac:    Denies recent chest pain,   GI:     No nausea or vomiting, no abdominal pain  Neuro:    No dizziness or light headedness,   Musculoskeletal:  No recent active issues  Extremities:   No edema, no obvious claudication       Objective:  Physical Exam  /80   Pulse 93   Ht 5' 1\" (1.549 m)   Wt 155 lb (70.3 kg)   BMI 29.29 kg/m²   General:   Well developed, well nourished  Lungs:   Clear to auscultation  Heart:    Normal S1 S2, Slight murmur.  no rubs, no gallops  Abdomen:   Soft, non tender, no organomegalies, positive bowel sounds Extremities:   No edema, no cyanosis, good peripheral pulses  Neurological:   Awake, alert, oriented. No obvious focal deficits  Musculoskelatal:  No obvious deformities    Assessment:      Diagnosis Orders   1. SVT (supraventricular tachycardia) ([facility])  EKG 12 lead   2. Dilated cardiomyopathy ([address]     as above  Higher HR   No arrhythmia   ECG in office was done today. I reviewed the ECG. No acute findings      Plan:  No follow-ups on file. As above  Consider toprol increased dose  Continue risk factor modification and medical management  Thank you for allowing me to participate in the care of your patient. Please don't hesitate to contact me regarding any further issues related to the patient care    Orders Placed:  Orders Placed This Encounter   Procedures    EKG 12 lead     Order Specific Question:   Reason for Exam?     Answer: Other       Medications Prescribed:  No orders of the defined types were placed in this encounter. Discussed use, benefit, and side effects of prescribed medications. All patient questions answered. Pt voicedunderstanding. Instructed to continue current medications, diet and exercise. Continue risk factor modification and medical management. Patient agreed with treatment plan. Follow up as directed.     Electronically signedby Isac Montejo MD on 2/10/2021 at 12:25 PM

## 2021-05-26 ENCOUNTER — TELEPHONE (OUTPATIENT)
Dept: CARDIOLOGY CLINIC | Age: 51
End: 2021-05-26

## 2021-05-26 NOTE — TELEPHONE ENCOUNTER
Pre op Risk Assessment    Procedure Rhinoplasty  Physician   Date of surgery/procedure 6/1/2021    Last OV 2/10/2021  Last Stress 10/11/2016  Last Echo 10/29/2018  Last Cath nothing in EPIC  Last Stent nothing in [facility]  Is patient on blood thinners none  Hold Meds/how many days     Fax to [phone]

## 2021-10-28 ENCOUNTER — OFFICE VISIT (OUTPATIENT)
Dept: FAMILY MEDICINE CLINIC | Age: 51
End: 2021-10-28

## 2021-10-28 VITALS
HEART RATE: 78 BPM | HEIGHT: 61 IN | RESPIRATION RATE: 16 BRPM | SYSTOLIC BLOOD PRESSURE: 108 MMHG | TEMPERATURE: 96.8 F | DIASTOLIC BLOOD PRESSURE: 64 MMHG | WEIGHT: 156.13 LBS | BODY MASS INDEX: 29.48 KG/M2

## 2021-10-28 DIAGNOSIS — E78.5 HYPERLIPIDEMIA, UNSPECIFIED HYPERLIPIDEMIA TYPE: ICD-10-CM

## 2021-10-28 DIAGNOSIS — I42.0 DILATED CARDIOMYOPATHY (HCC): ICD-10-CM

## 2021-10-28 DIAGNOSIS — Z86.69 HISTORY OF BLURRED VISION: Primary | ICD-10-CM

## 2021-10-28 PROCEDURE — 99214 OFFICE O/P EST MOD 30 MIN: CPT | Performed by: EMERGENCY MEDICINE

## 2021-10-28 ASSESSMENT — ENCOUNTER SYMPTOMS
VOICE CHANGE: 0
VOMITING: 0
BACK PAIN: 0
ABDOMINAL PAIN: 0
RHINORRHEA: 0
CONSTIPATION: 0
COUGH: 0
SORE THROAT: 0
NAUSEA: 0
SINUS PRESSURE: 0
CHEST TIGHTNESS: 0
TROUBLE SWALLOWING: 0
SHORTNESS OF BREATH: 0
DIARRHEA: 0
WHEEZING: 0

## 2021-10-28 NOTE — PROGRESS NOTES
Visit Date: 10/28/2021    Subjective:    Yahir Ramos is a 45 y. o.female who presents today for:  Chief Complaint   Patient presents with    Other     vision changes. sudden on set. lasted only a minute or two. HPI:     HPI     Yesterday while relaxing at home she gets 2 minute both eyes get blurry , + diplopia. Called her optometrist    No HA no flu like symptoms no numbness , no parestesia      CurrentHome Medications:  Current Outpatient Medications   Medication Sig Dispense Refill    lisinopril (PRINIVIL;ZESTRIL) 2.5 MG tablet TAKE 1 TABLET BY MOUTH ONE TIME A DAY 90 tablet 3    metoprolol succinate (TOPROL XL) 25 MG extended release tablet TAKE 1 TABLET BY MOUTH ONE TIME A DAY 90 tablet 3    atorvastatin (LIPITOR) 40 MG tablet 1 tab daily 90 tablet 3    escitalopram (LEXAPRO) 10 MG tablet Take 1 tablet by mouth daily (Patient not taking: Reported on 10/28/2021) 30 tablet 1    omeprazole (PRILOSEC) 40 MG delayed release capsule TAKE 1 CAPSULE BY MOUTH ONE TIME A DAY (Patient not taking: Reported on 10/28/2021)  2     No current facility-administered medications for this visit. Subjective:      Review of Systems   Constitutional: Negative for appetite change, chills, diaphoresis, fatigue and fever. HENT: Negative for congestion, ear pain, postnasal drip, rhinorrhea, sinus pressure, sneezing, sore throat, trouble swallowing and voice change. Eyes: Positive for visual disturbance. Respiratory: Negative for cough, chest tightness, shortness of breath and wheezing. Cardiovascular: Negative for chest pain, palpitations and leg swelling. Gastrointestinal: Negative for abdominal pain, constipation, diarrhea, nausea and vomiting. Musculoskeletal: Negative for arthralgias, back pain, joint swelling, myalgias, neck pain and neck stiffness. Neurological: Negative for dizziness, syncope, weakness, light-headedness, numbness and headaches.        Objective:     /64 (Site: Left Upper Arm, Position: Sitting, Cuff Size: Medium Adult)   Pulse 78   Temp 96.8 °F (36 °C) (Skin)   Resp 16   Ht 5' 1\" (1.549 m)   Wt 156 lb 2 oz (70.8 kg)   BMI 29.50 kg/m²   BP Readings from Last 3 Encounters:   10/28/21 108/64   02/10/21 122/80   01/27/20 108/68     Wt Readings from Last 3 Encounters:   10/28/21 156 lb 2 oz (70.8 kg)   02/10/21 155 lb (70.3 kg)   01/27/20 158 lb (71.7 kg)       Physical Exam  Vitals reviewed. Constitutional:       Appearance: She is well-developed. HENT:      Head: Normocephalic and atraumatic. Right Ear: External ear normal.      Left Ear: External ear normal.      Nose: Nose normal.   Eyes:      General: No scleral icterus. Conjunctiva/sclera: Conjunctivae normal.      Pupils: Pupils are equal, round, and reactive to light. Neck:      Thyroid: No thyromegaly. Vascular: No JVD. Cardiovascular:      Rate and Rhythm: Normal rate and regular rhythm. Heart sounds: No murmur heard. No friction rub. Pulmonary:      Effort: Pulmonary effort is normal.      Breath sounds: Normal breath sounds. No wheezing or rales. Chest:      Chest wall: No tenderness. Abdominal:      General: Bowel sounds are normal.      Palpations: Abdomen is soft. There is no mass. Tenderness: There is no abdominal tenderness. Musculoskeletal:      Cervical back: Normal range of motion and neck supple. Lymphadenopathy:      Cervical: No cervical adenopathy. Skin:     Findings: No rash. Neurological:      Mental Status: She is alert and oriented to person, place, and time. Psychiatric:         Behavior: Behavior is cooperative. Assessment:         Diagnosis Orders   1. History of blurred vision  CBC Auto Differential    Comprehensive Metabolic Panel    TSH with Reflex    T4, Free    Lipid Panel    Magnesium    Vitamin B12    Folate    CT HEAD WO CONTRAST   2.  Hyperlipidemia, unspecified hyperlipidemia type  Comprehensive Metabolic Panel    Lipid Panel 3. Dilated cardiomyopathy ([facility] [address]  Comprehensive Metabolic Panel    Lipid Panel       Plan:      Medications Prescribed:  No orders of the defined types were placed in this encounter. Orders Placed:  Orders Placed This Encounter   Procedures    CT HEAD WO CONTRAST     Standing Status:   Future     Standing Expiration Date:   10/28/2022    CBC Auto Differential     Standing Status:   Future     Standing Expiration Date:   10/28/2022    Comprehensive Metabolic Panel     Standing Status:   Future     Standing Expiration Date:   10/28/2022    TSH with Reflex     Standing Status:   Future     Standing Expiration Date:   10/28/2022    T4, Free     Standing Status:   Future     Standing Expiration Date:   10/28/2022    Lipid Panel     Standing Status:   Future     Standing Expiration Date:   10/28/2022     Order Specific Question:   Is Patient Fasting?/# of Hours     Answer:   yes 12 hours    Magnesium     Standing Status:   Future     Standing Expiration Date:   10/28/2022    Vitamin B12     Standing Status:   Future     Standing Expiration Date:   10/28/2022    Folate     Standing Status:   Future     Standing Expiration Date:   10/28/2022        Return in about 2 weeks (around 11/11/2021). Discussed use, benefit, and side effects of prescribedmedications. All patient questions answered. Pt voiced understanding. Instructedto continue current medications, diet and exercise. Patient agreed with treatmentplan.

## 2021-10-29 LAB
ABSOLUTE BASO #: 0.1 X10E9/L (ref 0–0.2)
ABSOLUTE EOS #: 0.2 X10E9/L (ref 0–0.4)
ABSOLUTE LYMPH #: 2.3 X10E9/L (ref 1–3.5)
ABSOLUTE MONO #: 0.6 X10E9/L (ref 0–0.9)
ABSOLUTE NEUT #: 4.1 X10E9/L (ref 1.5–6.6)
ALBUMIN SERPL-MCNC: 4.5 G/DL (ref 3.2–5.3)
ALK PHOSPHATASE: 68 U/L (ref 39–130)
ALT SERPL-CCNC: 13 U/L (ref 0–31)
ANION GAP SERPL CALCULATED.3IONS-SCNC: 7 MMOL/L (ref 5–15)
AST SERPL-CCNC: 15 U/L (ref 0–41)
BASOPHILS RELATIVE PERCENT: 1.1 %
BILIRUB SERPL-MCNC: 0.5 MG/DL (ref 0.3–1.2)
BUN BLDV-MCNC: 10 MG/DL (ref 5–23)
CALCIUM SERPL-MCNC: 9.5 MG/DL (ref 8.5–10.5)
CHLORIDE BLD-SCNC: 106 MMOL/L (ref 98–109)
CHOLESTEROL/HDL RATIO: 3.6 (ref 1–5)
CHOLESTEROL: 204 MG/DL (ref 150–200)
CO2: 28 MMOL/L (ref 22–32)
CREAT SERPL-MCNC: 0.75 MG/DL (ref 0.4–1)
EGFR AFRICAN AMERICAN: >60 ML/MIN/1.73SQ.M
EGFR IF NONAFRICAN AMERICAN: >60 ML/MIN/1.73SQ.M
EOSINOPHILS RELATIVE PERCENT: 3 %
FOLATE: 17 NG/ML
GLUCOSE: 85 MG/DL (ref 65–99)
HCT VFR BLD CALC: 46.2 % (ref 35–47)
HDLC SERPL-MCNC: 57 MG/DL
HEMOGLOBIN: 15.4 G/DL (ref 11.7–15.5)
LDL CHOLESTEROL CALCULATED: 111 MG/DL
LDL/HDL RATIO: 1.9
LYMPHOCYTE %: 32.1 %
MAGNESIUM: 1.9 MG/DL (ref 1.8–2.6)
MCH RBC QN AUTO: 29.7 PG (ref 27–34)
MCHC RBC AUTO-ENTMCNC: 33.3 G/DL (ref 32–36)
MCV RBC AUTO: 89 FL (ref 80–100)
MONOCYTES # BLD: 7.7 %
NEUTROPHILS RELATIVE PERCENT: 56.1 %
PDW BLD-RTO: 14.6 % (ref 11.5–15)
PLATELETS: 275 X10E9/L (ref 150–450)
PMV BLD AUTO: 7.4 FL (ref 7–12)
POTASSIUM SERPL-SCNC: 4 MMOL/L (ref 3.5–5)
RBC: 5.17 X10E12/L (ref 3.8–5.2)
SODIUM BLD-SCNC: 141 MMOL/L (ref 134–146)
TOTAL PROTEIN: 7 G/DL (ref 6–8)
TRIGL SERPL-MCNC: 182 MG/DL (ref 27–150)
TSH SERPL DL<=0.05 MIU/L-ACNC: 3.04 UIU/ML (ref 0.49–4.67)
VITAMIN B-12: 290 PG/ML (ref 180–914)
VLDLC SERPL CALC-MCNC: 36 MG/DL (ref 0–30)
WBC: 7.2 X10E9/L (ref 4–11)

## 2021-11-05 ENCOUNTER — HOSPITAL ENCOUNTER (OUTPATIENT)
Dept: CT IMAGING | Age: 51
Discharge: HOME | End: 2021-11-05
Payer: COMMERCIAL

## 2021-11-05 DIAGNOSIS — Z86.69 HISTORY OF BLURRED VISION: ICD-10-CM

## 2021-11-05 PROCEDURE — 70450 CT HEAD/BRAIN W/O DYE: CPT

## 2022-02-16 ENCOUNTER — OFFICE VISIT (OUTPATIENT)
Dept: CARDIOLOGY CLINIC | Age: 52
End: 2022-02-16
Payer: COMMERCIAL

## 2022-02-16 VITALS
HEIGHT: 61 IN | WEIGHT: 148 LBS | HEART RATE: 77 BPM | BODY MASS INDEX: 27.94 KG/M2 | SYSTOLIC BLOOD PRESSURE: 132 MMHG | DIASTOLIC BLOOD PRESSURE: 60 MMHG

## 2022-02-16 DIAGNOSIS — R06.02 SOB (SHORTNESS OF BREATH): Primary | ICD-10-CM

## 2022-02-16 DIAGNOSIS — I42.0 DILATED CARDIOMYOPATHY (HCC): ICD-10-CM

## 2022-02-16 PROCEDURE — 99213 OFFICE O/P EST LOW 20 MIN: CPT | Performed by: NUCLEAR MEDICINE

## 2022-02-16 PROCEDURE — 93000 ELECTROCARDIOGRAM COMPLETE: CPT | Performed by: NUCLEAR MEDICINE

## 2022-02-16 NOTE — PROGRESS NOTES
[id number] [facility] [address]  [facility] [address]  Dept: [phone]  Dept Fax: [phone]  Loc: [phone]    Visit Date: 2022    Louvenia Mcburney is a 45 y.o. female who presents todayfor:  Chief Complaint   Patient presents with    Check-Up    Cardiomyopathy     Improved CMP  No chest pain  No changes in breathing  Some dizziness  No syncope  BP stable   No syncope  On statins for hyperlipidemia    HPI:  HPI  Past Medical History:   Diagnosis Date    Depression     DUB (dysfunctional uterine bleeding) 2013    Hyperlipidemia     Nicotine dependence     Palpitations     Snores     SVT [facility]supraventricular tachycardia) ([facility] [address]       Past Surgical History:   Procedure Laterality Date    CARDIOVASCULAR STRESS TEST  2011    Patient activated transmissions notable for an episode suggestive of re-entry  SVT w/ heart rates up to 190's. Remaining transmissions appeared consistent w/ either sinus rhythm or sinus tachycardia. [address] , 2004    X 3    ENDOMETRIAL ABLATION  13    PO FRANKLIN      TRANSTHORACIC ECHOCARDIOGRAM  2011    LV was mildly dilated. Systolic function mildly reduced. EF 45%. Mild diffuse hypokinesis.  Mild MR and TR.     TUBAL LIGATION  2004     Family History   Problem Relation Age of Onset    Cancer Mother     Hypertension Mother     Diabetes Father     Heart Disease Father     High Cholesterol Father     Heart Surgery Father         Bypass/surgery     Social History     Tobacco Use    Smoking status: Former Smoker     Packs/day: 1.00     Years: 30.00     Pack years: 30.00     Types: Cigarettes     Quit date: 2019     Years since quittin.5    Smokeless tobacco: Never Used   Substance Use Topics    Alcohol use: No      Current Outpatient Medications   Medication Sig Dispense Refill    lisinopril (PRINIVIL;ZESTRIL) 2.5 MG tablet TAKE 1 TABLET BY MOUTH ONE TIME A DAY 90 tablet 3    metoprolol succinate (TOPROL XL) 25 MG extended release tablet TAKE 1 TABLET BY MOUTH ONE TIME A DAY 90 tablet 3    atorvastatin (LIPITOR) 40 MG tablet 1 tab daily 90 tablet 3     No current facility-administered medications for this visit. No Known Allergies  Health Maintenance   Topic Date Due    Hepatitis C screen  Never done    COVID-19 Vaccine (1) Never done    Pneumococcal 0-64 years Vaccine (1 of 2 - PPSV23) Never done    Depression Monitoring  Never done    HIV screen  Never done    Cervical cancer screen  Never done    DTaP/Tdap/Td vaccine (1 - Tdap) 08/15/2000    Shingles Vaccine (1 of 2) Never done    Low dose CT lung screening  Never done    Breast cancer screen  09/24/2022    Lipid screen  10/28/2022    Potassium monitoring  10/28/2022    Creatinine monitoring  10/28/2022    Colorectal Cancer Screen  11/25/2029    Flu vaccine  Completed    Hepatitis A vaccine  Aged Out    Hepatitis B vaccine  Aged Out    Hib vaccine  Aged Out    Meningococcal (ACWY) vaccine  Aged Out       Subjective:  Review of Systems  General:   No fever, no chills, No fatigue or weight loss  Pulmonary:    No dyspnea, no wheezing  Cardiac:    Denies recent chest pain,   GI:     No nausea or vomiting, no abdominal pain  Neuro:    No dizziness or light headedness,   Musculoskeletal:  No recent active issues  Extremities:   No edema, no obvious claudication       Objective:  Physical Exam  /60   Pulse 77   Ht 5' 1\" (1.549 m)   Wt 148 lb (67.1 kg)   BMI 27.96 kg/m²   General:   Well developed, well nourished  Lungs:   Clear to auscultation  Heart:    Normal S1 S2, Slight murmur. no rubs, no gallops  Abdomen:   Soft, non tender, no organomegalies, positive bowel sounds  Extremities:   No edema, no cyanosis, good peripheral pulses  Neurological:   Awake, alert, oriented.  No obvious focal deficits  Musculoskelatal:  No obvious deformities    Assessment:      Diagnosis Orders 1. SOB (shortness of breath)  EKG 12 lead   2. Dilated cardiomyopathy ([address]     as above  Cardiac fair for now   ECG in office was done today. I reviewed the ECG. No acute findings    Plan:  No follow-ups on file. As above  Continue risk factor modification and medical management  Thank you for allowing me to participate in the care of your patient. Please don't hesitate to contact me regarding any further issues related to the patient care    Orders Placed:  Orders Placed This Encounter   Procedures    EKG 12 lead     Order Specific Question:   Reason for Exam?     Answer: Other       Medications Prescribed:  No orders of the defined types were placed in this encounter. Discussed use, benefit, and side effects of prescribed medications. All patient questions answered. Pt voicedunderstanding. Instructed to continue current medications, diet and exercise. Continue risk factor modification and medical management. Patient agreed with treatment plan. Follow up as directed.     Electronically signedby Ashley Calvert MD on 2/16/2022 at 12:30 PM

## 2022-02-24 RX ORDER — METOPROLOL SUCCINATE 25 MG/1
TABLET, EXTENDED RELEASE ORAL
Qty: 90 TABLET | Refills: 3 | Status: SHIPPED | OUTPATIENT
Start: 2022-02-24

## 2022-02-24 RX ORDER — LISINOPRIL 2.5 MG/1
TABLET ORAL
Qty: 90 TABLET | Refills: 3 | Status: SHIPPED | OUTPATIENT
Start: 2022-02-24

## 2022-03-10 ENCOUNTER — NURSE ONLY (OUTPATIENT)
Dept: LAB | Age: 52
End: 2022-03-10

## 2022-05-11 RX ORDER — ATORVASTATIN CALCIUM 40 MG/1
TABLET, FILM COATED ORAL
Qty: 30 TABLET | Refills: 5 | Status: SHIPPED | OUTPATIENT
Start: 2022-05-11

## 2022-05-11 NOTE — TELEPHONE ENCOUNTER
Date of last visit:  2020  Date of next visit:  Visit date not found    Requested Prescriptions     Pending Prescriptions Disp Refills    atorvastatin (LIPITOR) 40 MG tablet 30 tablet 0     Si tab daily

## 2022-05-11 NOTE — TELEPHONE ENCOUNTER
Pt called requesting Rx for Atorvastatin 40 mg one daily.     Said she will call back to schedule an appt    Kristy Petersen

## 2022-06-01 ENCOUNTER — TELEPHONE (OUTPATIENT)
Dept: FAMILY MEDICINE CLINIC | Age: 52
End: 2022-06-01

## 2022-06-01 ENCOUNTER — HOSPITAL ENCOUNTER (OUTPATIENT)
Dept: GENERAL RADIOLOGY | Age: 52
Discharge: HOME | End: 2022-06-01
Payer: COMMERCIAL

## 2022-06-01 ENCOUNTER — HOSPITAL ENCOUNTER (OUTPATIENT)
Age: 52
Discharge: HOME | End: 2022-06-01
Payer: COMMERCIAL

## 2022-06-01 ENCOUNTER — OFFICE VISIT (OUTPATIENT)
Dept: FAMILY MEDICINE CLINIC | Age: 52
End: 2022-06-01

## 2022-06-01 VITALS
HEART RATE: 76 BPM | WEIGHT: 151.8 LBS | HEIGHT: 61 IN | RESPIRATION RATE: 12 BRPM | BODY MASS INDEX: 28.66 KG/M2 | DIASTOLIC BLOOD PRESSURE: 72 MMHG | SYSTOLIC BLOOD PRESSURE: 128 MMHG

## 2022-06-01 DIAGNOSIS — R05.9 COUGH: ICD-10-CM

## 2022-06-01 DIAGNOSIS — J01.00 ACUTE NON-RECURRENT MAXILLARY SINUSITIS: ICD-10-CM

## 2022-06-01 DIAGNOSIS — I47.1 SVT (SUPRAVENTRICULAR TACHYCARDIA) (HCC): ICD-10-CM

## 2022-06-01 DIAGNOSIS — Z78.0 MENOPAUSE: ICD-10-CM

## 2022-06-01 DIAGNOSIS — J01.00 ACUTE NON-RECURRENT MAXILLARY SINUSITIS: Primary | ICD-10-CM

## 2022-06-01 LAB
INFLUENZA A: NOT DETECTED
INFLUENZA B: NOT DETECTED
SARS-COV-2 RNA, RT PCR: NOT DETECTED

## 2022-06-01 PROCEDURE — 87636 SARSCOV2 & INF A&B AMP PRB: CPT

## 2022-06-01 PROCEDURE — 71046 X-RAY EXAM CHEST 2 VIEWS: CPT

## 2022-06-01 PROCEDURE — 99214 OFFICE O/P EST MOD 30 MIN: CPT | Performed by: FAMILY MEDICINE

## 2022-06-01 RX ORDER — AMOXICILLIN AND CLAVULANATE POTASSIUM 875; 125 MG/1; MG/1
1 TABLET, FILM COATED ORAL 2 TIMES DAILY
Qty: 20 TABLET | Refills: 0 | Status: SHIPPED | OUTPATIENT
Start: 2022-06-01 | End: 2022-06-11

## 2022-06-01 SDOH — ECONOMIC STABILITY: FOOD INSECURITY: WITHIN THE PAST 12 MONTHS, YOU WORRIED THAT YOUR FOOD WOULD RUN OUT BEFORE YOU GOT MONEY TO BUY MORE.: NEVER TRUE

## 2022-06-01 SDOH — ECONOMIC STABILITY: FOOD INSECURITY: WITHIN THE PAST 12 MONTHS, THE FOOD YOU BOUGHT JUST DIDN'T LAST AND YOU DIDN'T HAVE MONEY TO GET MORE.: NEVER TRUE

## 2022-06-01 ASSESSMENT — PATIENT HEALTH QUESTIONNAIRE - PHQ9
SUM OF ALL RESPONSES TO PHQ QUESTIONS 1-9: 4
SUM OF ALL RESPONSES TO PHQ QUESTIONS 1-9: 4
1. LITTLE INTEREST OR PLEASURE IN DOING THINGS: 0
2. FEELING DOWN, DEPRESSED OR HOPELESS: 0
9. THOUGHTS THAT YOU WOULD BE BETTER OFF DEAD, OR OF HURTING YOURSELF: 0
SUM OF ALL RESPONSES TO PHQ9 QUESTIONS 1 & 2: 0
3. TROUBLE FALLING OR STAYING ASLEEP: 2
4. FEELING TIRED OR HAVING LITTLE ENERGY: 2
10. IF YOU CHECKED OFF ANY PROBLEMS, HOW DIFFICULT HAVE THESE PROBLEMS MADE IT FOR YOU TO DO YOUR WORK, TAKE CARE OF THINGS AT HOME, OR GET ALONG WITH OTHER PEOPLE: 0
8. MOVING OR SPEAKING SO SLOWLY THAT OTHER PEOPLE COULD HAVE NOTICED. OR THE OPPOSITE, BEING SO FIGETY OR RESTLESS THAT YOU HAVE BEEN MOVING AROUND A LOT MORE THAN USUAL: 0
SUM OF ALL RESPONSES TO PHQ QUESTIONS 1-9: 4
7. TROUBLE CONCENTRATING ON THINGS, SUCH AS READING THE NEWSPAPER OR WATCHING TELEVISION: 0
6. FEELING BAD ABOUT YOURSELF - OR THAT YOU ARE A FAILURE OR HAVE LET YOURSELF OR YOUR FAMILY DOWN: 0
5. POOR APPETITE OR OVEREATING: 0
SUM OF ALL RESPONSES TO PHQ QUESTIONS 1-9: 4

## 2022-06-01 ASSESSMENT — ENCOUNTER SYMPTOMS
SORE THROAT: 1
SINUS PRESSURE: 1
DIARRHEA: 0
COUGH: 1
CHEST TIGHTNESS: 0
ABDOMINAL PAIN: 0
SINUS PAIN: 1
RHINORRHEA: 1
SHORTNESS OF BREATH: 0
EYES NEGATIVE: 1
NAUSEA: 0
CONSTIPATION: 0
VOMITING: 0

## 2022-06-01 ASSESSMENT — SOCIAL DETERMINANTS OF HEALTH (SDOH): HOW HARD IS IT FOR YOU TO PAY FOR THE VERY BASICS LIKE FOOD, HOUSING, MEDICAL CARE, AND HEATING?: NOT HARD AT ALL

## 2022-06-01 NOTE — PROGRESS NOTES
Date: 2022    Conception Alex is a 47 y.o. female who presents today for:  Chief Complaint   Patient presents with    Menopause     Discuss she is having hot flashes mostly at night and sometimes during the day, vaginal dryness, foggy at times going on for a couple of months or so. She had a uterine ablation in  and she does not have periods since then. She states that her worse symptom is the hot flashes. She smokes and her mother had breast cancer.  Sinusitis for about 1 weeks now. HPI:     HPI    has a current medication list which includes the following prescription(s): amoxicillin-clavulanate, atorvastatin, metoprolol succinate, and lisinopril. No Known Allergies    Social History     Tobacco Use    Smoking status: Former Smoker     Packs/day: 1.00     Years: 30.00     Pack years: 30.00     Types: Cigarettes     Quit date: 2019     Years since quittin.8    Smokeless tobacco: Never Used   Substance Use Topics    Alcohol use: No    Drug use: No       Past Medical History:   Diagnosis Date    Depression     DUB (dysfunctional uterine bleeding) 2013    Hyperlipidemia     Nicotine dependence     Palpitations     Snores     SVT (supraventricular tachycardia) ([facility] [address]        Past Surgical History:   Procedure Laterality Date    CARDIOVASCULAR STRESS TEST  2011    Patient activated transmissions notable for an episode suggestive of re-entry  SVT w/ heart rates up to 190's. Remaining transmissions appeared consistent w/ either sinus rhythm or sinus tachycardia. [place], , 2004    X 3    ENDOMETRIAL ABLATION  13    PO FRANKLIN      TRANSTHORACIC ECHOCARDIOGRAM  2011    LV was mildly dilated. Systolic function mildly reduced. EF 45%. Mild diffuse hypokinesis.  Mild MR and TR.     TUBAL LIGATION         Family History   Problem Relation Age of Onset    Cancer Mother     Hypertension Mother     Diabetes Father     Heart Disease Father     High Cholesterol Father     Heart Surgery Father         Bypass/surgery     Subjective:     Review of Systems   Constitutional: Negative for activity change, appetite change, chills, diaphoresis, fatigue and fever. HENT: Positive for congestion, postnasal drip, rhinorrhea, sinus pressure, sinus pain and sore throat. Eyes: Negative. Respiratory: Positive for cough (yellow here and there). Negative for chest tightness and shortness of breath. Cardiovascular: Negative for chest pain, palpitations and leg swelling. Gastrointestinal: Negative for abdominal pain, constipation, diarrhea, nausea and vomiting. Endocrine:        Having hot flashes   Genitourinary: Negative. Musculoskeletal: Negative. Skin: Negative. Negative for rash. Neurological: Positive for headaches ( frontal). Negative for dizziness, syncope, weakness, light-headedness and numbness. Psychiatric/Behavioral: Negative.        :   /72 (Site: Right Upper Arm, Position: Sitting, Cuff Size: Medium Adult)   Pulse 76   Resp 12   Ht 5' 1\" (1.549 m)   Wt 151 lb 12.8 oz (68.9 kg)   BMI 28.68 kg/m²   Wt Readings from Last 3 Encounters:   06/01/22 151 lb 12.8 oz (68.9 kg)   02/16/22 148 lb (67.1 kg)   10/28/21 156 lb 2 oz (70.8 kg)     Physical Exam  Vitals and nursing note reviewed. Constitutional:       General: She is not in acute distress. Appearance: She is well-developed. She is not diaphoretic. HENT:      Head: Normocephalic and atraumatic. Right Ear: Hearing, tympanic membrane, ear canal and external ear normal.      Left Ear: Hearing, tympanic membrane, ear canal and external ear normal.      Nose: Congestion present. Right Sinus: Maxillary sinus tenderness present. No frontal sinus tenderness. Left Sinus: Maxillary sinus tenderness present. No frontal sinus tenderness. Mouth/Throat:      Lips: Pink.       Mouth: Mucous membranes are moist.      Pharynx: Posterior oropharyngeal erythema present. Tonsils: No tonsillar exudate or tonsillar abscesses. Eyes:      General: No scleral icterus. Right eye: No discharge. Left eye: No discharge. Conjunctiva/sclera: Conjunctivae normal.      Pupils: Pupils are equal, round, and reactive to light. Neck:      Thyroid: No thyromegaly. Vascular: No JVD. Cardiovascular:      Rate and Rhythm: Normal rate and regular rhythm. Heart sounds: Normal heart sounds. No murmur heard. Pulmonary:      Effort: No respiratory distress. Breath sounds: Normal breath sounds. No wheezing, rhonchi or rales. Abdominal:      General: Bowel sounds are normal. There is no distension. Palpations: Abdomen is soft. There is no mass. Tenderness: There is no abdominal tenderness. There is no guarding or rebound. Musculoskeletal:         General: Normal range of motion. Cervical back: Normal range of motion and neck supple. Lymphadenopathy:      Cervical: No cervical adenopathy. Skin:     General: Skin is warm and dry. Findings: No rash. Neurological:      Mental Status: She is alert and oriented to person, place, and time. Psychiatric:         Behavior: Behavior normal.       :       Diagnosis Orders   1. Acute non-recurrent maxillary sinusitis  amoxicillin-clavulanate (AUGMENTIN) 875-125 MG per tablet    XR CHEST (2 VW)   2. Cough  XR CHEST (2 VW)    COVID-19   3. Menopause  XR CHEST (2 VW)   4.  SVT (supraventricular tachycardia) (HCC)         :      Requested Prescriptions     Signed Prescriptions Disp Refills    amoxicillin-clavulanate (AUGMENTIN) 875-125 MG per tablet 20 tablet 0     Sig: Take 1 tablet by mouth 2 times daily for 10 days     Current Outpatient Medications   Medication Sig Dispense Refill    amoxicillin-clavulanate (AUGMENTIN) 875-125 MG per tablet Take 1 tablet by mouth 2 times daily for 10 days 20 tablet 0    atorvastatin (LIPITOR) 40 MG tablet 1 tab daily 30 tablet 5    metoprolol succinate (TOPROL XL) 25 MG extended release tablet TAKE 1 TABLET BY MOUTH EVERY DAY 90 tablet 3    lisinopril (PRINIVIL;ZESTRIL) 2.5 MG tablet TAKE 1 TABLET BY MOUTH EVERY DAY 90 tablet 3     No current facility-administered medications for this visit. Orders Placed This Encounter   Procedures    XR CHEST (2 VW)     Standing Status:   Future     Standing Expiration Date:   6/1/2023    COVID-19     Standing Status:   Future     Standing Expiration Date:   6/1/2023     Scheduling Instructions:      1) Due to current limited availability of the COVID-19 test, tests will be prioritized based on responses to questions above. Testing may be delayed due to volume. 2) Print and instruct patient to adhere to CDC home isolation program. (Link Above)              3) Set up or refer patient for a monitoring program.              4) Have patient sign up for and leverage MyChart (if not previously done). Order Specific Question:   Is this test for diagnosis or screening? Answer:   Diagnosis of ill patient     Order Specific Question:   Symptomatic for COVID-19 as defined by CDC? Answer:   Yes     Order Specific Question:   Date of Symptom Onset     Answer:   5/26/2022     Order Specific Question:   Hospitalized for COVID-19? Answer:   No     Order Specific Question:   Admitted to ICU for COVID-19? Answer:   No     Order Specific Question:   Employed in healthcare setting? Answer:   No     Order Specific Question:   Resident in a congregate (group) care setting? Answer:   No     Order Specific Question:   Pregnant? Answer:   No     Order Specific Question:   Previously tested for COVID-19? Answer:   Yes     She is past due for her mammogram and she will schedule appt. We discussed treatment for menopause, she unfortunately smokes and hormonal therapy would not be her best choice, also OTC remedies and antidepressants to treat symptoms.  At this time she states that it is not that band and she does not want to take any meds. She will let us know if she changes her mind and we can try antidepressants. Continue current medications. Return if symptoms worsen or fail to improve. Discussed use, benefit, and side effects of prescribed medications. All patient questions answered. Pt voiced understanding. Instructed to continue current medications,diet and exercise. Patient agreed with treatment plan.

## 2022-06-01 NOTE — TELEPHONE ENCOUNTER
----- Message from Heaven Machado MD sent at 6/1/2022  2:53 PM EDT -----  Please let her know that her x-ray did not show any pneumonia but it did show some changes of COPD. We need her to quit smoking.

## 2022-06-02 ENCOUNTER — TELEPHONE (OUTPATIENT)
Dept: FAMILY MEDICINE CLINIC | Age: 52
End: 2022-06-02

## 2022-06-02 RX ORDER — BUPROPION HYDROCHLORIDE 150 MG/1
150 TABLET ORAL EVERY MORNING
Qty: 30 TABLET | Refills: 1 | Status: SHIPPED | OUTPATIENT
Start: 2022-06-02 | End: 2022-07-29

## 2022-06-02 NOTE — TELEPHONE ENCOUNTER
----- Message from Brook Toribio MD sent at 6/1/2022  4:52 PM EDT -----  Please hansel her know that her COVID TEST WAS NEGATIVE.

## 2022-06-02 NOTE — TELEPHONE ENCOUNTER
Patient informed. She is also wondering about starting on Wellbutrin to help with her not smoking and her anxiety.     Please call her [phone]

## 2022-06-08 NOTE — TELEPHONE ENCOUNTER
Spoke to [employer] did  the Wellbutrin. MOM advising her to call and schedule a 1 month F/U appt per Dr Nilo Kenney.

## 2022-06-29 ENCOUNTER — HOSPITAL ENCOUNTER (EMERGENCY)
Age: 52
Discharge: HOME | End: 2022-06-29
Payer: COMMERCIAL

## 2022-06-29 VITALS
TEMPERATURE: 98.4 F | BODY MASS INDEX: 28.32 KG/M2 | RESPIRATION RATE: 16 BRPM | SYSTOLIC BLOOD PRESSURE: 134 MMHG | HEIGHT: 61 IN | WEIGHT: 150 LBS | HEART RATE: 106 BPM | OXYGEN SATURATION: 96 % | DIASTOLIC BLOOD PRESSURE: 82 MMHG

## 2022-06-29 DIAGNOSIS — J02.9 ALLERGIC PHARYNGITIS: Primary | ICD-10-CM

## 2022-06-29 PROCEDURE — 99213 OFFICE O/P EST LOW 20 MIN: CPT

## 2022-06-29 PROCEDURE — 99213 OFFICE O/P EST LOW 20 MIN: CPT | Performed by: NURSE PRACTITIONER

## 2022-06-29 RX ORDER — FLUTICASONE PROPIONATE 50 MCG
1 SPRAY, SUSPENSION (ML) NASAL DAILY
Qty: 16 G | Refills: 0 | Status: SHIPPED | OUTPATIENT
Start: 2022-06-29 | End: 2022-08-19

## 2022-06-29 ASSESSMENT — ENCOUNTER SYMPTOMS
EYE DISCHARGE: 0
DIARRHEA: 0
SINUS PRESSURE: 0
SINUS PAIN: 0
TROUBLE SWALLOWING: 0
SHORTNESS OF BREATH: 0
WHEEZING: 0
EYE REDNESS: 0
COUGH: 1
RHINORRHEA: 1
NAUSEA: 0
CHEST TIGHTNESS: 0
VOMITING: 0
SORE THROAT: 1

## 2022-06-29 ASSESSMENT — PAIN DESCRIPTION - FREQUENCY: FREQUENCY: CONTINUOUS

## 2022-06-29 ASSESSMENT — PAIN DESCRIPTION - DESCRIPTORS: DESCRIPTORS: BURNING

## 2022-06-29 ASSESSMENT — PAIN DESCRIPTION - LOCATION: LOCATION: THROAT

## 2022-06-29 ASSESSMENT — PAIN DESCRIPTION - ONSET: ONSET: ON-GOING

## 2022-06-29 ASSESSMENT — PAIN SCALES - GENERAL: PAINLEVEL_OUTOF10: 4

## 2022-06-29 ASSESSMENT — PAIN - FUNCTIONAL ASSESSMENT: PAIN_FUNCTIONAL_ASSESSMENT: 0-10

## 2022-06-29 ASSESSMENT — PAIN DESCRIPTION - PAIN TYPE: TYPE: ACUTE PAIN

## 2022-06-29 NOTE — ED PROVIDER NOTES
[phone]       Chief Complaint   Patient presents with    Pharyngitis     productive cough       Nurses Notes reviewed and I agree except as noted in the HPI. HISTORY OF PRESENT ILLNESS   Cara Millard is a 47 y.o. female who presents for evaluation of sore throat. Onset of symptoms greater than 2 weeks ago, unchanged. Sore throat waxing/waning. Sore throat worsening over the last 24 hours. No fever, trouble swallowing. No strep, COVID, flu exposure. Denies history of GERD. Patient was placed on Augmentin earlier in , minimal improvement. History of deviated septum. Patient states that she is a frequent mouth breather at nighttime. No current treatment. REVIEW OF SYSTEMS     Review of Systems   Constitutional: Negative for chills, diaphoresis, fatigue and fever. HENT: Positive for congestion, postnasal drip, rhinorrhea and sore throat. Negative for ear pain, sinus pressure, sinus pain and trouble swallowing. Eyes: Negative for discharge and redness. Respiratory: Positive for cough. Negative for chest tightness, shortness of breath and wheezing. Cardiovascular: Negative for chest pain and palpitations. Gastrointestinal: Negative for diarrhea, nausea and vomiting. Genitourinary: Negative for decreased urine volume. Musculoskeletal: Negative for neck pain and neck stiffness. Skin: Negative for rash. Neurological: Negative for headaches. Hematological: Negative for adenopathy. Psychiatric/Behavioral: Negative for sleep disturbance. PAST MEDICAL HISTORY         Diagnosis Date    Depression     DUB (dysfunctional uterine bleeding) 2013    Hyperlipidemia     Nicotine dependence     Palpitations     Snores     SVT (supraventricular tachycardia) ([facility] [address]        SURGICAL HISTORY     Patient  has a past surgical history that includes  section (, , );  Tubal ligation (); cardiovascular stress test (2 04 2011); transthoracic echocardiogram (2 04 2011); LEEP; and Endometrial ablation (11/6/13). CURRENT MEDICATIONS       Previous Medications    ATORVASTATIN (LIPITOR) 40 MG TABLET    1 tab daily    BUPROPION (WELLBUTRIN XL) 150 MG EXTENDED RELEASE TABLET    Take 1 tablet by mouth every morning    LISINOPRIL (PRINIVIL;ZESTRIL) 2.5 MG TABLET    TAKE 1 TABLET BY MOUTH EVERY DAY    METOPROLOL SUCCINATE (TOPROL XL) 25 MG EXTENDED RELEASE TABLET    TAKE 1 TABLET BY MOUTH EVERY DAY       ALLERGIES     Patient is has No Known Allergies. FAMILY HISTORY     Patient'sfamily history includes Cancer in her mother; Diabetes in her father; Heart Disease in her father; Heart Surgery in her father; High Cholesterol in her father; Hypertension in her mother. SOCIAL HISTORY     Patient  reports that she quit smoking about 2 years ago. Her smoking use included cigarettes. She has a 30.00 pack-year smoking history. She has never used smokeless tobacco. She reports that she does not drink alcohol and does not use drugs. PHYSICAL EXAM     ED TRIAGE VITALS  BP: 134/82, Temp: 98.4 °F (36.9 °C), Heart Rate: (!) 106, Resp: 16, SpO2: 96 %  Physical Exam  Vitals and nursing note reviewed. Constitutional:       General: She is not in acute distress. Appearance: Normal appearance. She is well-developed. She is not ill-appearing, toxic-appearing or diaphoretic. HENT:      Head: Normocephalic and atraumatic. Right Ear: Hearing, tympanic membrane, ear canal and external ear normal. No mastoid tenderness. No hemotympanum. Tympanic membrane is not perforated, erythematous or bulging. Left Ear: Hearing, tympanic membrane, ear canal and external ear normal. No mastoid tenderness. No hemotympanum. Tympanic membrane is not perforated, erythematous or bulging. Nose: Mucosal edema present. Mouth/Throat:      Mouth: Mucous membranes are moist.      Pharynx: Oropharynx is clear.  Uvula midline. Tonsils: No tonsillar abscesses. Eyes:      General: No scleral icterus. Conjunctiva/sclera: Conjunctivae normal.      Right eye: Right conjunctiva is not injected. No hemorrhage. Left eye: Left conjunctiva is not injected. No hemorrhage. Neck:      Thyroid: No thyromegaly. Trachea: Trachea normal.   Cardiovascular:      Rate and Rhythm: Normal rate and regular rhythm. No extrasystoles are present. Chest Wall: PMI is not displaced. Heart sounds: Normal heart sounds. No murmur heard. No friction rub. No gallop. Pulmonary:      Effort: Pulmonary effort is normal. No respiratory distress. Breath sounds: Normal breath sounds. Chest:   Breasts:      Right: No supraclavicular adenopathy. Left: No supraclavicular adenopathy. Musculoskeletal:      Cervical back: Normal range of motion and neck supple. Lymphadenopathy:      Head:      Right side of head: No submental, submandibular, tonsillar or occipital adenopathy. Left side of head: No submental, submandibular, tonsillar or occipital adenopathy. Cervical: No cervical adenopathy. Upper Body:      Right upper body: No supraclavicular adenopathy. Left upper body: No supraclavicular adenopathy. Skin:     General: Skin is warm and dry. Capillary Refill: Capillary refill takes less than 2 seconds. Coloration: Skin is not pale. Findings: No rash. Comments: Skin warm and dry to touch, no rashes noted on exposed surfaces. Neurological:      Mental Status: She is alert and oriented to person, place, and time. She is not disoriented. Psychiatric:         Mood and Affect: Mood normal.         Behavior: Behavior is cooperative. DIAGNOSTIC RESULTS   Labs: No results found for this visit on 06/29/22.     IMAGING:  No orders to display     URGENT CARE COURSE:     Vitals:    06/29/22 0922   BP: 134/82   Pulse: (!) 106   Resp: 16   Temp: 98.4 °F (36.9 °C)   TempSrc: Temporal   SpO2: 96%   Weight: 150 lb (68 kg)   Height: 5' 1\" (1.549 m)       Medications - No data to display  PROCEDURES:  None  FINALIMPRESSION      1. Allergic pharyngitis        DISPOSITION/PLAN   DISPOSITION Decision To Discharge 06/29/2022 09:40:39 AM  Nontoxic, no distress. Sore throat secondary to postnasal drainage. Consistent with allergic pharyngitis. Medication as prescribed, resume sinus rinse twice daily. Increase fluids. If symptoms worsen return or go to ER. PATIENT REFERRED TO:  Joshua Monsalve MD  [address] [place] [address]  [phone]      Follow up as needed. Medication as prescribed. Sinus rinse 2x/day. Increase clear fluids. If worse return or go to ER.     DISCHARGE MEDICATIONS:  New Prescriptions    FLUTICASONE (FLONASE) 50 MCG/ACT NASAL SPRAY    1 spray by Each Nostril route daily     Current Discharge Medication List          Kalin Albarado, [address] [facility], APRN - CNP  06/29/22 9247

## 2022-06-29 NOTE — ED NOTES
Pt presents to [facility] with c/o cough and burning pain in throat x 2-3wks.       Kem Heller, CHANDRA  34/59/35 0285

## 2022-07-05 ENCOUNTER — HOSPITAL ENCOUNTER (OUTPATIENT)
Dept: WOMENS IMAGING | Age: 52
Discharge: HOME | End: 2022-07-05
Payer: COMMERCIAL

## 2022-07-05 DIAGNOSIS — Z12.31 VISIT FOR SCREENING MAMMOGRAM: ICD-10-CM

## 2022-07-05 PROCEDURE — 77063 BREAST TOMOSYNTHESIS BI: CPT

## 2022-07-29 RX ORDER — BUPROPION HYDROCHLORIDE 150 MG/1
TABLET ORAL
Qty: 30 TABLET | Refills: 0 | Status: SHIPPED | OUTPATIENT
Start: 2022-07-29 | End: 2022-09-13

## 2022-07-29 NOTE — TELEPHONE ENCOUNTER
Date of last visit:  6/1/2022  Date of next visit:  Visit date not found    Requested Prescriptions     Pending Prescriptions Disp Refills    buPROPion (WELLBUTRIN XL) 150 MG extended release tablet [Pharmacy Med Name: buPROPion HCl ER (XL) Oral Tablet Extended Release 24 Hour 150 MG] 30 tablet 0     Sig: TAKE 1 TABLET BY MOUTH IN THE MORNING

## 2022-08-19 ENCOUNTER — OFFICE VISIT (OUTPATIENT)
Dept: FAMILY MEDICINE CLINIC | Age: 52
End: 2022-08-19

## 2022-08-19 VITALS
BODY MASS INDEX: 29.34 KG/M2 | HEART RATE: 68 BPM | HEIGHT: 61 IN | RESPIRATION RATE: 12 BRPM | SYSTOLIC BLOOD PRESSURE: 116 MMHG | DIASTOLIC BLOOD PRESSURE: 70 MMHG | WEIGHT: 155.4 LBS

## 2022-08-19 DIAGNOSIS — E78.5 HYPERLIPIDEMIA, UNSPECIFIED HYPERLIPIDEMIA TYPE: ICD-10-CM

## 2022-08-19 DIAGNOSIS — F17.210 CIGARETTE NICOTINE DEPENDENCE WITHOUT COMPLICATION: ICD-10-CM

## 2022-08-19 DIAGNOSIS — R07.89 ATYPICAL CHEST PAIN: ICD-10-CM

## 2022-08-19 DIAGNOSIS — I47.1 SVT (SUPRAVENTRICULAR TACHYCARDIA) (HCC): ICD-10-CM

## 2022-08-19 DIAGNOSIS — F32.89 OTHER DEPRESSION: ICD-10-CM

## 2022-08-19 DIAGNOSIS — K21.9 GASTROESOPHAGEAL REFLUX DISEASE, UNSPECIFIED WHETHER ESOPHAGITIS PRESENT: ICD-10-CM

## 2022-08-19 PROCEDURE — 99215 OFFICE O/P EST HI 40 MIN: CPT | Performed by: FAMILY MEDICINE

## 2022-08-19 RX ORDER — OMEPRAZOLE 40 MG/1
40 CAPSULE, DELAYED RELEASE ORAL
Qty: 90 CAPSULE | Refills: 0 | Status: SHIPPED | OUTPATIENT
Start: 2022-08-19

## 2022-08-19 ASSESSMENT — ENCOUNTER SYMPTOMS
VOMITING: 0
NAUSEA: 0
DIARRHEA: 0
SORE THROAT: 0
CONSTIPATION: 0
CHEST TIGHTNESS: 0
ABDOMINAL PAIN: 0
SHORTNESS OF BREATH: 0
EYES NEGATIVE: 1
COUGH: 0
HEARTBURN: 1

## 2022-08-19 NOTE — PROGRESS NOTES
Date: 8/19/2022    Shannon Thornton is a 47 y.o. female who presents today for:  Chief Complaint   Patient presents with    Hyperlipidemia    6 Month Follow-Up       HPI:     Hyperlipidemia  This is a chronic problem. The current episode started more than 1 year ago. The problem is controlled. Recent lipid tests were reviewed and are normal. Pertinent negatives include no chest pain, focal sensory loss, focal weakness, leg pain, myalgias or shortness of breath. Current antihyperlipidemic treatment includes statins. The current treatment provides significant improvement of lipids. Gastroesophageal Reflux  She complains of heartburn. She reports no abdominal pain, no chest pain, no coughing, no dysphagia, no nausea or no sore throat. This is a new problem. Episode onset: in the last month. The problem occurs frequently. The problem has been waxing and waning. Heartburn location: in her thorat. The heartburn is of moderate intensity. The heartburn wakes her from sleep. The heartburn does not limit her activity. The heartburn changes with position. The symptoms are aggravated by bending, certain foods and lying down. Pertinent negatives include no fatigue. Risk factors include smoking/tobacco exposure and caffeine use. Treatments tried: Omeprazole and lots of Tums. The treatment provided mild relief. has a current medication list which includes the following prescription(s): omeprazole, bupropion, atorvastatin, metoprolol succinate, and lisinopril.     No Known Allergies    Social History     Tobacco Use    Smoking status: Every Day     Packs/day: 1.00     Years: 38.00     Pack years: 38.00     Types: Cigarettes    Smokeless tobacco: Never   Vaping Use    Vaping Use: Never used   Substance Use Topics    Alcohol use: Yes     Comment: rarely    Drug use: No       Past Medical History:   Diagnosis Date    Depression     DUB (dysfunctional uterine bleeding) 11/6/2013    Hyperlipidemia     Nicotine dependence Palpitations     Snores     SVT (supraventricular tachycardia) ([address]        Past Surgical History:   Procedure Laterality Date    CARDIOVASCULAR STRESS TEST  2 04 2011    Patient activated transmissions notable for an episode suggestive of re-entry  SVT w/ heart rates up to 190's. Remaining transmissions appeared consistent w/ either sinus rhythm or sinus tachycardia. [facility], 2001, 2004    X 3    ENDOMETRIAL ABLATION  11/6/13    [facility]      TRANSTHORACIC ECHOCARDIOGRAM  2 04 2011    LV was mildly dilated. Systolic function mildly reduced. EF 45%. Mild diffuse hypokinesis. Mild MR and TR. TUBAL LIGATION  2004       Family History   Problem Relation Age of Onset    Hypertension Mother     Breast Cancer Mother 48    Diabetes Father     Heart Disease Father     High Cholesterol Father     Heart Surgery Father         Bypass/surgery     Subjective:     Review of Systems   Constitutional:  Negative for activity change, appetite change, diaphoresis, fatigue and fever. HENT: Negative. Negative for sore throat. Eyes: Negative. Respiratory:  Negative for cough, chest tightness and shortness of breath. Cardiovascular:  Negative for chest pain, palpitations and leg swelling. Gastrointestinal:  Positive for heartburn. Negative for abdominal pain, constipation, diarrhea, dysphagia, nausea and vomiting. Genitourinary: Negative. Musculoskeletal: Negative. Negative for myalgias. Skin: Negative. Negative for rash. Neurological:  Negative for dizziness, focal weakness, syncope, weakness, light-headedness, numbness and headaches.    Psychiatric/Behavioral: Negative.       :   /70 (Site: Right Upper Arm, Position: Sitting, Cuff Size: Medium Adult)   Pulse 68   Resp 12   Ht 5' 1\" (1.549 m)   Wt 155 lb 6.4 oz (70.5 kg)   BMI 29.36 kg/m²   Wt Readings from Last 3 Encounters:   08/19/22 155 lb 6.4 oz (70.5 kg)   06/29/22 150 lb (68 kg)   06/01/22 151 lb 12.8 oz (68.9 kg) Physical Exam  Vitals and nursing note reviewed. Constitutional:       General: She is not in acute distress. Appearance: She is well-developed. She is not diaphoretic. HENT:      Head: Normocephalic and atraumatic. Eyes:      General: No scleral icterus. Right eye: No discharge. Left eye: No discharge. Conjunctiva/sclera: Conjunctivae normal.      Pupils: Pupils are equal, round, and reactive to light. Neck:      Thyroid: No thyromegaly. Vascular: No JVD. Cardiovascular:      Rate and Rhythm: Normal rate and regular rhythm. Heart sounds: Normal heart sounds. No murmur heard. Pulmonary:      Effort: No respiratory distress. Breath sounds: Normal breath sounds. No wheezing, rhonchi or rales. Abdominal:      General: Bowel sounds are normal. There is no distension. Palpations: Abdomen is soft. There is no mass. Tenderness: There is no abdominal tenderness. There is no guarding or rebound. Musculoskeletal:         General: Normal range of motion. Cervical back: Normal range of motion and neck supple. Lymphadenopathy:      Cervical: No cervical adenopathy. Skin:     General: Skin is warm and dry. Findings: No rash. Neurological:      Mental Status: She is alert and oriented to person, place, and time. Psychiatric:         Behavior: Behavior normal.     :       Diagnosis Orders   1. Atypical chest pain  EKG 12 lead      2. SVT (supraventricular tachycardia) (HCC)  EKG 12 lead      3. Hyperlipidemia, unspecified hyperlipidemia type  Lipid Panel    AST    ALT      4. Cigarette nicotine dependence without complication        5. Gastroesophageal reflux disease, unspecified whether esophagitis present  omeprazole (PRILOSEC) 40 MG delayed release capsule    MONROE - Shauna Rivers MD, Gastroenter[facility], [facility]      6.  Other depression            :      Requested Prescriptions     Signed Prescriptions Disp Refills    omeprazole (PRILOSEC) 40 MG delayed release capsule 90 capsule 0     Sig: Take 1 capsule by mouth every morning (before breakfast)     Current Outpatient Medications   Medication Sig Dispense Refill    omeprazole (PRILOSEC) 40 MG delayed release capsule Take 1 capsule by mouth every morning (before breakfast) 90 capsule 0    buPROPion (WELLBUTRIN XL) 150 MG extended release tablet TAKE 1 TABLET BY MOUTH IN THE MORNING 30 tablet 0    atorvastatin (LIPITOR) 40 MG tablet 1 tab daily 30 tablet 5    metoprolol succinate (TOPROL XL) 25 MG extended release tablet TAKE 1 TABLET BY MOUTH EVERY DAY 90 tablet 3    lisinopril (PRINIVIL;ZESTRIL) 2.5 MG tablet TAKE 1 TABLET BY MOUTH EVERY DAY 90 tablet 3     No current facility-administered medications for this visit. Orders Placed This Encounter   Procedures    Lipid Panel     Standing Status:   Future     Standing Expiration Date:   8/19/2023     Order Specific Question:   Is Patient Fasting?/# of Hours     Answer:   yes 12 hours    AST     Standing Status:   Future     Standing Expiration Date:   8/19/2023    ALT     Standing Status:   Future     Standing Expiration Date:   8/19/2023    MONROE Gonzáles MD, Gastroenterology, [facility]     Referral Priority:   Routine     Referral Type:   Eval and Treat     Referral Reason:   Specialty Services Required     Referred to Provider:   Gray Oakes MD     Requested Specialty:   Gastroenterology     Number of Visits Requested:   1    EKG 12 lead     Standing Status:   Future     Standing Expiration Date:   10/18/2022     Order Specific Question:   Reason for Exam?     Answer: Atypical chest pain         Continue current medications. Return in about 2 weeks (around 9/2/2022). Discussed use, benefit, and side effects of prescribed medications. All patient questions answered. Pt voiced understanding. Instructed to continue current medications,diet and exercise. Patient agreed with treatment plan.      Allergies, Problem List, Medications, Medical History, Family History, Surgical History and Tobacco History reviewed by provider.

## 2022-08-19 NOTE — PROGRESS NOTES
Have you seen any other physician or provider since your last visit no    Have you had any other diagnostic tests since your last visit? no    Have you changed or stopped any medications since your last visit including any over-the-counter medicines, vitamins, or herbal medicines? no     Are you taking all your prescribed medications? Yes    If NO, why? No components found for: CHLPL  Lab Results   Component Value Date    TRIG 182 (H) 10/28/2021     Lab Results   Component Value Date    HDL 57 10/28/2021     Lab Results   Component Value Date    LDLCALC 111 10/28/2021     Lab Results   Component Value Date    LABVLDL 36 (H) 10/28/2021       Lab Results   Component Value Date    ALT 13 10/28/2021    AST 15 10/28/2021    ALKPHOS 68 10/28/2021    BILITOT 0.5 10/28/2021           Is patient currently taking any cholesterol medications? Yes   If yes, see med list as above    Is the patient reporting any side effects of cholesterol medications? No    Is the patient taking any over the counter medications? Yes   If yes, see med list as above    Is the patient taking a daily aspirin? No    Patient Self-Management Goal for Chronic Condition  Goal: I will take all medications as prescribed by my doctor, and I will call the office if I am having any medication problems.   Barriers to success: none  Plan for overcoming my barriers: N/A     Confidence: 9/10  Date goal set: 8/19/22  Date goal attained: \

## 2022-08-22 DIAGNOSIS — R07.89 ATYPICAL CHEST PAIN: ICD-10-CM

## 2022-08-22 DIAGNOSIS — I47.1 SVT (SUPRAVENTRICULAR TACHYCARDIA) (HCC): ICD-10-CM

## 2022-09-13 RX ORDER — BUPROPION HYDROCHLORIDE 150 MG/1
TABLET ORAL
Qty: 30 TABLET | Refills: 2 | Status: SHIPPED | OUTPATIENT
Start: 2022-09-13

## 2022-09-13 NOTE — TELEPHONE ENCOUNTER
Date of last visit:  8/19/2022  Date of next visit:  Visit date not found    Requested Prescriptions     Pending Prescriptions Disp Refills    buPROPion (WELLBUTRIN XL) 150 MG extended release tablet [Pharmacy Med Name: buPROPion HCl ER (XL) Oral Tablet Extended Release 24 Hour 150 MG] 30 tablet 5     Sig: TAKE 1 TABLET BY MOUTH IN THE MORNING

## 2022-10-06 ENCOUNTER — APPOINTMENT (OUTPATIENT)
Dept: GENERAL RADIOLOGY | Age: 52
End: 2022-10-06
Payer: COMMERCIAL

## 2022-10-06 ENCOUNTER — HOSPITAL ENCOUNTER (EMERGENCY)
Age: 52
Discharge: HOME | End: 2022-10-06
Payer: COMMERCIAL

## 2022-10-06 VITALS
HEART RATE: 75 BPM | DIASTOLIC BLOOD PRESSURE: 64 MMHG | RESPIRATION RATE: 17 BRPM | TEMPERATURE: 98.2 F | HEIGHT: 61 IN | OXYGEN SATURATION: 99 % | BODY MASS INDEX: 28.32 KG/M2 | WEIGHT: 150 LBS | SYSTOLIC BLOOD PRESSURE: 100 MMHG

## 2022-10-06 DIAGNOSIS — R07.9 CHEST PAIN, UNSPECIFIED TYPE: Primary | ICD-10-CM

## 2022-10-06 LAB
ALT SERPL-CCNC: 16 U/L (ref 5–40)
ANION GAP SERPL CALCULATED.3IONS-SCNC: 12 MEQ/L (ref 8–16)
AST SERPL-CCNC: 17 U/L (ref 9–40)
BASOPHILS # BLD: 1.2 %
BASOPHILS ABSOLUTE: 0.1 THOU/MM3 (ref 0–0.1)
BUN BLDV-MCNC: 11 MG/DL (ref 7–22)
CALCIUM SERPL-MCNC: 9.6 MG/DL (ref 8.5–10.5)
CHLORIDE BLD-SCNC: 105 MEQ/L (ref 98–111)
CHOLESTEROL/HDL RATIO: 4.3 RATIO
CHOLESTEROL: 219 MG/DL
CO2: 25 MEQ/L (ref 23–33)
CREAT SERPL-MCNC: 0.8 MG/DL (ref 0.4–1.2)
EKG ATRIAL RATE: 70 BPM
EKG P AXIS: 68 DEGREES
EKG P-R INTERVAL: 190 MS
EKG Q-T INTERVAL: 352 MS
EKG QRS DURATION: 84 MS
EKG QTC CALCULATION (BAZETT): 380 MS
EKG R AXIS: 47 DEGREES
EKG T AXIS: 57 DEGREES
EKG VENTRICULAR RATE: 70 BPM
EOSINOPHIL # BLD: 3.2 %
EOSINOPHILS ABSOLUTE: 0.2 THOU/MM3 (ref 0–0.4)
ERYTHROCYTE [DISTWIDTH] IN BLOOD BY AUTOMATED COUNT: 14.9 % (ref 11.5–14.5)
ERYTHROCYTE [DISTWIDTH] IN BLOOD BY AUTOMATED COUNT: 50.7 FL (ref 35–45)
GFR SERPL CREATININE-BSD FRML MDRD: 75 ML/MIN/1.73M2
GLUCOSE BLD-MCNC: 91 MG/DL (ref 70–108)
HCT VFR BLD CALC: 46.8 % (ref 37–47)
HDLC SERPL-MCNC: 51 MG/DL
HEMOGLOBIN: 15.2 GM/DL (ref 12–16)
IMMATURE GRANS (ABS): 0.03 THOU/MM3 (ref 0–0.07)
IMMATURE GRANULOCYTES: 0.4 %
LDL CHOLESTEROL CALCULATED: 137 MG/DL
LDL/HDL RATIO: 2.7 RATIO
LYMPHOCYTES # BLD: 48.2 %
LYMPHOCYTES ABSOLUTE: 3.3 THOU/MM3 (ref 1–4.8)
MCH RBC QN AUTO: 29.7 PG (ref 26–33)
MCHC RBC AUTO-ENTMCNC: 32.5 GM/DL (ref 32.2–35.5)
MCV RBC AUTO: 91.4 FL (ref 81–99)
MONOCYTES # BLD: 7.6 %
MONOCYTES ABSOLUTE: 0.5 THOU/MM3 (ref 0.4–1.3)
NUCLEATED RED BLOOD CELLS: 0 /100 WBC
OSMOLALITY CALCULATION: 282.1 MOSMOL/KG (ref 275–300)
PLATELET # BLD: 243 THOU/MM3 (ref 130–400)
PMV BLD AUTO: 8.7 FL (ref 9.4–12.4)
POTASSIUM REFLEX MAGNESIUM: 3.7 MEQ/L (ref 3.5–5.2)
PRO-BNP: 61.5 PG/ML (ref 0–900)
RBC # BLD: 5.12 MILL/MM3 (ref 4.2–5.4)
SEG NEUTROPHILS: 39.4 %
SEGMENTED NEUTROPHILS ABSOLUTE COUNT: 2.7 THOU/MM3 (ref 1.8–7.7)
SODIUM BLD-SCNC: 142 MEQ/L (ref 135–145)
TRIGL SERPL-MCNC: 157 MG/DL
TROPONIN T: < 0.01 NG/ML
TROPONIN T: < 0.01 NG/ML
VLDLC SERPL CALC-MCNC: 31 MG/DL
WBC # BLD: 6.9 THOU/MM3 (ref 4.8–10.8)

## 2022-10-06 PROCEDURE — 83880 ASSAY OF NATRIURETIC PEPTIDE: CPT

## 2022-10-06 PROCEDURE — 85025 COMPLETE CBC W/AUTO DIFF WBC: CPT

## 2022-10-06 PROCEDURE — 71045 X-RAY EXAM CHEST 1 VIEW: CPT

## 2022-10-06 PROCEDURE — 93010 ELECTROCARDIOGRAM REPORT: CPT | Performed by: INTERNAL MEDICINE

## 2022-10-06 PROCEDURE — 99285 EMERGENCY DEPT VISIT HI MDM: CPT

## 2022-10-06 PROCEDURE — 93005 ELECTROCARDIOGRAM TRACING: CPT | Performed by: EMERGENCY MEDICINE

## 2022-10-06 PROCEDURE — 6370000000 HC RX 637 (ALT 250 FOR IP): Performed by: PHYSICIAN ASSISTANT

## 2022-10-06 PROCEDURE — 84484 ASSAY OF TROPONIN QUANT: CPT

## 2022-10-06 PROCEDURE — 36415 COLL VENOUS BLD VENIPUNCTURE: CPT

## 2022-10-06 PROCEDURE — 80048 BASIC METABOLIC PNL TOTAL CA: CPT

## 2022-10-06 RX ORDER — ASPIRIN 81 MG/1
324 TABLET, CHEWABLE ORAL ONCE
Status: COMPLETED | OUTPATIENT
Start: 2022-10-06 | End: 2022-10-06

## 2022-10-06 RX ADMIN — ASPIRIN 81 MG 324 MG: 81 TABLET ORAL at 10:00

## 2022-10-06 ASSESSMENT — ENCOUNTER SYMPTOMS
NAUSEA: 0
DIARRHEA: 0
SORE THROAT: 0
VOMITING: 0
COUGH: 0

## 2022-10-06 ASSESSMENT — HEART SCORE: ECG: 1

## 2022-10-06 ASSESSMENT — PAIN - FUNCTIONAL ASSESSMENT: PAIN_FUNCTIONAL_ASSESSMENT: NONE - DENIES PAIN

## 2022-10-06 NOTE — ED NOTES
Presents to ER with complaints of chest pain that has been intermittent for 6 months. Pt states she has seen a GI dr and was prescribed medications but has had no relief. States around 0300 this morning she felt a sharp pain that made her short of breath. Denies pain at this time. EKG completed.      Lizzy Sheldon, RN  10/06/22 8543

## 2022-10-06 NOTE — ED NOTES
Patient resting in bed. Respirations easy and unlabored. No distress noted. Call light within reach.      Lizzy Sheldon RN  10/06/22 2578

## 2022-10-06 NOTE — ED PROVIDER NOTES
[address] [facility] [address] [address] EMERGENCY DEPT  [address] [facility] [address]  Phone: [phone]        CHIEF COMPLAINT       Chief Complaint   Patient presents with    Chest Pain       Nurses Notes reviewed and I agree except as notedin the HPI. HISTORY OF PRESENT ILLNESS    Abby Mendez is a 47 y.o. female who presents chest pain is been going for about 6 weeks. The patient started taking some over-the-counter omeprazole. She seen her PCP who put her on a prescription for omeprazole. The patient has seen another physician that recommended Pepcid also and she also started taking Carafate for her GI doctor. She states her symptoms have been getting worse. She is concerned because her brother had a history of heart disease at a young age of 43years old. REVIEW OF SYSTEMS     Review of Systems   Constitutional:  Negative for chills and fever. HENT:  Negative for congestion, ear pain and sore throat. Respiratory:  Negative for cough. Cardiovascular:  Positive for chest pain. Negative for palpitations. Gastrointestinal:  Negative for diarrhea, nausea and vomiting. Genitourinary:  Negative for dysuria and urgency. Musculoskeletal:  Negative for myalgias and neck pain. Skin:  Negative for rash. All other systems reviewed and are negative. PAST MEDICAL HISTORY    has a past medical history of Depression, DUB (dysfunctional uterine bleeding), Hyperlipidemia, Nicotine dependence, Palpitations, Snores, and SVT (supraventricular tachycardia) ([address] SURGICAL HISTORY      has a past surgical history that includes  section (, , ); Tubal ligation (); cardiovascular stress test (2011); transthoracic echocardiogram (2011); LEEP; and Endometrial ablation (13).     CURRENT MEDICATIONS       Discharge Medication List as of 10/6/2022 11:38 AM        CONTINUE these medications which have NOT CHANGED    Details   buPROPion (WELLBUTRIN XL) 150 MG extended release tablet TAKE 1 TABLET BY MOUTH IN THE MORNING, Disp-30 tablet, R-2Normal      omeprazole (PRILOSEC) 40 MG delayed release capsule Take 1 capsule by mouth every morning (before breakfast), Disp-90 capsule, R-0Normal      atorvastatin (LIPITOR) 40 MG tablet 1 tab daily, Disp-30 tablet, R-5Normal      metoprolol succinate (TOPROL XL) 25 MG extended release tablet TAKE 1 TABLET BY MOUTH EVERY DAY, Disp-90 tablet, R-3Normal      lisinopril (PRINIVIL;ZESTRIL) 2.5 MG tablet TAKE 1 TABLET BY MOUTH EVERY DAY, Disp-90 tablet, R-3Normal             ALLERGIES     has No Known Allergies. HISTORY     She indicated that her mother is alive. She indicated that her father is . family history includes Breast Cancer (age of onset: 48) in her mother; Diabetes in her father; Heart Disease in her father; Heart Surgery in her father; High Cholesterol in her father; Hypertension in her mother. SOCIALHISTORY      reports that she has been smoking cigarettes. She has a 38.00 pack-year smoking history. She has never used smokeless tobacco. She reports current alcohol use. She reports that she does not use drugs. PHYSICAL EXAM     INITIAL VITALS:  height is 5' 1\" (1.549 m) and weight is 150 lb (68 kg). Her oral temperature is 98.2 °F (36.8 °C). Her blood pressure is 100/64 and her pulse is 75. Her respiration is 17 and oxygen saturation is 99%. Physical Exam  Vitals and nursing note reviewed. HENT:      Head: Normocephalic and atraumatic. Eyes:      Pupils: Pupils are equal, round, and reactive to light. Neck:      Thyroid: No thyromegaly. Trachea: No tracheal deviation. Cardiovascular:      Rate and Rhythm: Normal rate and regular rhythm. Heart sounds: No murmur heard. No friction rub. Pulmonary:      Effort: Pulmonary effort is normal. No respiratory distress. Breath sounds: Normal breath sounds. No wheezing. Abdominal:      General: Bowel sounds are normal. There is no distension.       Palpations: Abdomen is soft. Tenderness: There is no abdominal tenderness. Musculoskeletal:      Cervical back: Neck supple. Skin:     General: Skin is warm and dry. Findings: No erythema or rash. Neurological:      Mental Status: She is alert and oriented to person, place, and time. DIFFERENTIAL DIAGNOSIS:   Heartburn rule out ACS    DIAGNOSTIC RESULTS     EKG: All EKG's are interpreted by the Emergency Department Physician who either signs or Co-signs this chart in the absence of a cardiologist.      RADIOLOGY: non-plain film images(s) such as CT, Ultrasound and MRI are read by the radiologist.  XR CHEST PORTABLE   Final Result   Stable radiographic appearance of the chest. No evidence of an acute process. **This report has been created using voice recognition software. It may contain minor errors which are inherent in voice recognition technology. **      Final report electronically signed by Dr. Jamar Blackmon MD on 10/6/2022 10:02 AM            LABS:   Labs Reviewed   CBC WITH AUTO DIFFERENTIAL - Abnormal; Notable for the following components:       Result Value    RDW-CV 14.9 (*)     RDW-SD 50.7 (*)     MPV 8.7 (*)     All other components within normal limits   GLOMERULAR FILTRATION RATE, ESTIMATED - Abnormal; Notable for the following components:    Est, Glom Filt Rate 75 (*)     All other components within normal limits   BASIC METABOLIC PANEL W/ REFLEX TO MG FOR LOW K   BRAIN NATRIURETIC PEPTIDE   TROPONIN   ANION GAP   OSMOLALITY   TROPONIN       EMERGENCY DEPARTMENT COURSE:   :    Vitals:    10/06/22 0931 10/06/22 1000 10/06/22 1044   BP: 130/67 95/69 100/64   Pulse: 71 74 75   Resp: 18 17 17   Temp: 98.2 °F (36.8 °C)     TempSrc: Oral     SpO2: 99% 99% 99%   Weight: 150 lb (68 kg)     Height: 5' 1\" (1.549 m)       Patient was seen history physical exam was performed.   Patient was given 4 baby aspirin p.o. patient will follow-up with cardiology patient is pain-free see

## 2022-10-10 ENCOUNTER — TELEPHONE (OUTPATIENT)
Dept: FAMILY MEDICINE CLINIC | Age: 52
End: 2022-10-10

## 2022-10-10 NOTE — TELEPHONE ENCOUNTER
----- Message from Lexie Moore MD sent at 10/10/2022  3:44 PM EDT -----  Please verify if she is taking her Lipitor daily

## 2023-01-18 ENCOUNTER — OFFICE VISIT (OUTPATIENT)
Dept: FAMILY MEDICINE CLINIC | Age: 53
End: 2023-01-18

## 2023-01-18 VITALS
RESPIRATION RATE: 12 BRPM | DIASTOLIC BLOOD PRESSURE: 78 MMHG | HEIGHT: 61 IN | BODY MASS INDEX: 28.34 KG/M2 | SYSTOLIC BLOOD PRESSURE: 126 MMHG | HEART RATE: 72 BPM

## 2023-01-18 DIAGNOSIS — I10 PRIMARY HYPERTENSION: ICD-10-CM

## 2023-01-18 DIAGNOSIS — I47.1 SVT (SUPRAVENTRICULAR TACHYCARDIA) (HCC): ICD-10-CM

## 2023-01-18 DIAGNOSIS — K21.9 GASTROESOPHAGEAL REFLUX DISEASE, UNSPECIFIED WHETHER ESOPHAGITIS PRESENT: ICD-10-CM

## 2023-01-18 DIAGNOSIS — E78.5 HYPERLIPIDEMIA, UNSPECIFIED HYPERLIPIDEMIA TYPE: ICD-10-CM

## 2023-01-18 DIAGNOSIS — Z86.19 HISTORY OF COLD SORES: ICD-10-CM

## 2023-01-18 DIAGNOSIS — B37.2 CANDIDAL INTERTRIGO: ICD-10-CM

## 2023-01-18 DIAGNOSIS — F33.42 RECURRENT MAJOR DEPRESSIVE DISORDER, IN FULL REMISSION (HCC): ICD-10-CM

## 2023-01-18 RX ORDER — PANTOPRAZOLE SODIUM 40 MG/1
40 TABLET, DELAYED RELEASE ORAL 2 TIMES DAILY
COMMUNITY
Start: 2023-01-17

## 2023-01-18 RX ORDER — NYSTATIN 100000 U/G
CREAM TOPICAL
Qty: 30 G | Refills: 1 | Status: SHIPPED | OUTPATIENT
Start: 2023-01-18

## 2023-01-18 RX ORDER — ACYCLOVIR 800 MG/1
800 TABLET ORAL DAILY PRN
Qty: 30 TABLET | Refills: 0 | Status: SHIPPED | OUTPATIENT
Start: 2023-01-18

## 2023-01-18 ASSESSMENT — ENCOUNTER SYMPTOMS
COUGH: 0
ABDOMINAL PAIN: 0
CHEST TIGHTNESS: 0
VOMITING: 0
SHORTNESS OF BREATH: 0
DIARRHEA: 0
NAUSEA: 0
CONSTIPATION: 0
EYES NEGATIVE: 1

## 2023-01-18 ASSESSMENT — PATIENT HEALTH QUESTIONNAIRE - PHQ9
5. POOR APPETITE OR OVEREATING: 0
6. FEELING BAD ABOUT YOURSELF - OR THAT YOU ARE A FAILURE OR HAVE LET YOURSELF OR YOUR FAMILY DOWN: 0
4. FEELING TIRED OR HAVING LITTLE ENERGY: 0
2. FEELING DOWN, DEPRESSED OR HOPELESS: 0
8. MOVING OR SPEAKING SO SLOWLY THAT OTHER PEOPLE COULD HAVE NOTICED. OR THE OPPOSITE, BEING SO FIGETY OR RESTLESS THAT YOU HAVE BEEN MOVING AROUND A LOT MORE THAN USUAL: 0
SUM OF ALL RESPONSES TO PHQ QUESTIONS 1-9: 0
3. TROUBLE FALLING OR STAYING ASLEEP: 0
10. IF YOU CHECKED OFF ANY PROBLEMS, HOW DIFFICULT HAVE THESE PROBLEMS MADE IT FOR YOU TO DO YOUR WORK, TAKE CARE OF THINGS AT HOME, OR GET ALONG WITH OTHER PEOPLE: 0
SUM OF ALL RESPONSES TO PHQ QUESTIONS 1-9: 0
1. LITTLE INTEREST OR PLEASURE IN DOING THINGS: 0
SUM OF ALL RESPONSES TO PHQ QUESTIONS 1-9: 0
SUM OF ALL RESPONSES TO PHQ QUESTIONS 1-9: 0
7. TROUBLE CONCENTRATING ON THINGS, SUCH AS READING THE NEWSPAPER OR WATCHING TELEVISION: 0
9. THOUGHTS THAT YOU WOULD BE BETTER OFF DEAD, OR OF HURTING YOURSELF: 0
SUM OF ALL RESPONSES TO PHQ9 QUESTIONS 1 & 2: 0

## 2023-01-18 NOTE — PROGRESS NOTES
Date: 1/18/2023    Cassy Bautista is a 47 y.o. female who presents today for:  Chief Complaint   Patient presents with    Gastroesophageal Reflux    Follow-up  She had a uterine ablation in 2014 and has no menses since. Most recently she is having problems with intercourse as she is dry and they are using lubricants [place] and then she is ok. She is getting hot flashes, getting alfaro. She follows with GYN Dr Renita Hoff. She quit smoking 1/2/23. HPI:     Gastroesophageal Reflux  She reports no abdominal pain, no chest pain, no coughing or no nausea. This is a chronic problem. The current episode started more than 1 year ago. The problem occurs rarely. The problem has been unchanged. Pertinent negatives include no fatigue. She has tried a PPI for the symptoms. The treatment provided significant relief. Hypertension  This is a chronic problem. The current episode started more than 1 year ago. The problem is unchanged. The problem is controlled. Pertinent negatives include no chest pain, headaches, palpitations, peripheral edema, PND or shortness of breath. Risk factors for coronary artery disease include dyslipidemia and post-menopausal state. Past treatments include beta blockers and ACE inhibitors. The current treatment provides significant improvement. There are no compliance problems. Hyperlipidemia  This is a chronic problem. The current episode started more than 1 year ago. The problem is controlled. Recent lipid tests were reviewed and are normal. Pertinent negatives include no chest pain, focal sensory loss, focal weakness, leg pain, myalgias or shortness of breath. Current antihyperlipidemic treatment includes statins. The current treatment provides significant improvement of lipids. There are no compliance problems.   Risk factors for coronary artery disease include dyslipidemia, hypertension and post-menopausal.     has a current medication list which includes the following prescription(s): pantoprazole, acyclovir, nystatin, bupropion, atorvastatin, metoprolol succinate, and lisinopril. No Known Allergies    Social History     Tobacco Use    Smoking status: Former     Packs/day: 1.00     Years: 38.00     Pack years: 38.00     Types: Cigarettes     Quit date: 2023     Years since quittin.0    Smokeless tobacco: Never   Vaping Use    Vaping Use: Never used   Substance Use Topics    Alcohol use: Yes     Comment: rarely    Drug use: No       Past Medical History:   Diagnosis Date    Depression     DUB (dysfunctional uterine bleeding) 2013    Hyperlipidemia     Nicotine dependence     Palpitations     Snores     SVT (supraventricular tachycardia) ([facility] [address]        Past Surgical History:   Procedure Laterality Date    CARDIOVASCULAR STRESS TEST  2011    Patient activated transmissions notable for an episode suggestive of re-entry  SVT w/ heart rates up to 190's. Remaining transmissions appeared consistent w/ either sinus rhythm or sinus tachycardia. [address] [facility], , 2004    X 3    ENDOMETRIAL ABLATION  13    NOVASURE    LEEP      TRANSTHORACIC ECHOCARDIOGRAM  2011    LV was mildly dilated. Systolic function mildly reduced. EF 45%. Mild diffuse hypokinesis. Mild MR and TR. TUBAL LIGATION         Family History   Problem Relation Age of Onset    Hypertension Mother     Breast Cancer Mother 48    Diabetes Father     Heart Disease Father     High Cholesterol Father     Heart Surgery Father         Bypass/surgery     Subjective:     Review of Systems   Constitutional:  Negative for activity change, appetite change, diaphoresis, fatigue and fever. HENT: Negative. Eyes: Negative. Respiratory:  Negative for cough, chest tightness and shortness of breath. Cardiovascular:  Negative for chest pain, palpitations, leg swelling and PND. Gastrointestinal:  Negative for abdominal pain, constipation, diarrhea, nausea and vomiting. Genitourinary: Negative. Musculoskeletal: Negative. Negative for myalgias. Skin:  Positive for rash (under her breasts. ). Neurological:  Negative for dizziness, focal weakness, syncope, weakness, light-headedness, numbness and headaches. Psychiatric/Behavioral: Negative.       :   /78 (Site: Right Upper Arm, Position: Sitting, Cuff Size: Medium Adult)   Pulse 72   Resp 12   Ht 5' 1\" (1.549 m)   BMI 28.34 kg/m²   Wt Readings from Last 3 Encounters:   10/06/22 150 lb (68 kg)   08/19/22 155 lb 6.4 oz (70.5 kg)   06/29/22 150 lb (68 kg)     Physical Exam  Vitals and nursing note reviewed. Constitutional:       General: She is not in acute distress. Appearance: She is well-developed. She is not diaphoretic. HENT:      Head: Normocephalic and atraumatic. Eyes:      General: No scleral icterus. Right eye: No discharge. Left eye: No discharge. Conjunctiva/sclera: Conjunctivae normal.      Pupils: Pupils are equal, round, and reactive to light. Neck:      Thyroid: No thyromegaly. Vascular: No JVD. Cardiovascular:      Rate and Rhythm: Normal rate and regular rhythm. Heart sounds: Normal heart sounds. No murmur heard. Pulmonary:      Effort: No respiratory distress. Breath sounds: Normal breath sounds. No wheezing, rhonchi or rales. Abdominal:      General: Bowel sounds are normal. There is no distension. Palpations: Abdomen is soft. There is no mass. Tenderness: There is no abdominal tenderness. There is no guarding or rebound. Musculoskeletal:         General: Normal range of motion. Cervical back: Normal range of motion and neck supple. Lymphadenopathy:      Cervical: No cervical adenopathy. Skin:     General: Skin is warm and dry. Findings: Rash present. Comments: She has an intertriginous rash under her breast bilaterally. Neurological:      Mental Status: She is alert and oriented to person, place, and time.    Psychiatric:         Behavior: Behavior normal.     :       Diagnosis Orders   1. Primary hypertension        2. Hyperlipidemia, unspecified hyperlipidemia type  AST    ALT    Lipid Panel      3. [facility]        4. History of cold sores  acyclovir (ZOVIRAX) 800 MG tablet      5. Gastroesophageal reflux disease, unspecified whether esophagitis present  pantoprazole (PROTONIX) 40 MG tablet      6. Candidal intertrigo  nystatin (MYCOSTATIN) 591207 UNIT/GM cream      7. Recurrent major depressive disorder, in full remission ([facility]            :      Requested Prescriptions     Signed Prescriptions Disp Refills    acyclovir (ZOVIRAX) 800 MG tablet 30 tablet 0     Sig: Take 1 tablet by mouth daily as needed (for cold sores)    nystatin (MYCOSTATIN) 462068 UNIT/GM cream 30 g 1     Sig: Apply topically 2 times daily. Current Outpatient Medications   Medication Sig Dispense Refill    pantoprazole (PROTONIX) 40 MG tablet Take 40 mg by mouth in the morning and at bedtime      acyclovir (ZOVIRAX) 800 MG tablet Take 1 tablet by mouth daily as needed (for cold sores) 30 tablet 0    nystatin (MYCOSTATIN) 733883 UNIT/GM cream Apply topically 2 times daily. 30 g 1    buPROPion (WELLBUTRIN XL) 150 MG extended release tablet TAKE 1 TABLET BY MOUTH IN THE MORNING 30 tablet 2    atorvastatin (LIPITOR) 40 MG tablet 1 tab daily 30 tablet 5    metoprolol succinate (TOPROL XL) 25 MG extended release tablet TAKE 1 TABLET BY MOUTH EVERY DAY 90 tablet 3    lisinopril (PRINIVIL;ZESTRIL) 2.5 MG tablet TAKE 1 TABLET BY MOUTH EVERY DAY 90 tablet 3     No current facility-administered medications for this visit.      Orders Placed This Encounter   Procedures    AST     Standing Status:   Future     Standing Expiration Date:   1/18/2024    ALT     Standing Status:   Future     Standing Expiration Date:   1/18/2024    Lipid Panel     Standing Status:   Future     Standing Expiration Date:   1/18/2024     Order Specific Question:   Is Patient Fasting?/# of Hours     Answer:   yes 12 hours     Continue current medications. Congratulated her for quitting smoking and to encourage her to remain smoke free. Advised to check with her gynecologist as I do not have any copy of her last Pap as she might need to be on hormonal replacement therapy. Return in about 4 months (around 5/18/2023), or if symptoms worsen or fail to improve, for HTN. Discussed use, benefit, and side effects of prescribed medications. All patient questions answered. Pt voiced understanding. Instructed to continue current medications,diet and exercise. Patient agreed with treatment plan. Allergies, Problem List, Medications, Medical History, Family History, Surgical History and Tobacco History reviewed by provider.

## 2023-02-22 ENCOUNTER — OFFICE VISIT (OUTPATIENT)
Dept: CARDIOLOGY CLINIC | Age: 53
End: 2023-02-22
Payer: COMMERCIAL

## 2023-02-22 VITALS
HEART RATE: 87 BPM | SYSTOLIC BLOOD PRESSURE: 120 MMHG | HEIGHT: 61 IN | DIASTOLIC BLOOD PRESSURE: 78 MMHG | WEIGHT: 166 LBS | BODY MASS INDEX: 31.34 KG/M2

## 2023-02-22 DIAGNOSIS — E78.01 FAMILIAL HYPERCHOLESTEROLEMIA: ICD-10-CM

## 2023-02-22 DIAGNOSIS — I42.0 DILATED CARDIOMYOPATHY (HCC): Primary | ICD-10-CM

## 2023-02-22 DIAGNOSIS — I10 PRIMARY HYPERTENSION: ICD-10-CM

## 2023-02-22 PROCEDURE — 3074F SYST BP LT 130 MM HG: CPT | Performed by: NUCLEAR MEDICINE

## 2023-02-22 PROCEDURE — 99213 OFFICE O/P EST LOW 20 MIN: CPT | Performed by: NUCLEAR MEDICINE

## 2023-02-22 PROCEDURE — 3078F DIAST BP <80 MM HG: CPT | Performed by: NUCLEAR MEDICINE

## 2023-02-22 NOTE — PROGRESS NOTES
[id number]  [address]  [facility] [address]  Dept: [phone]  Dept Fax: [phone]  Loc: [phone]    Visit Date: 2023    Hanford Bence is a 47 y.o. female who presents todayfor:  Chief Complaint   Patient presents with    1 Year Follow Up    Cardiomyopathy    Hypertension    Hyperlipidemia     Admitted to the ER for some GI issues  Known improved CMP   On medical RX  No chest pain   No changes in breathing  No dizziness  No syncope  On statins for hyperlipidemia      HPI:  HPI  Past Medical History:   Diagnosis Date    Depression     DUB (dysfunctional uterine bleeding) 2013    Hyperlipidemia     Nicotine dependence     Palpitations     Snores     SVT (supraventricular tachycardia) ([facility] [address]       Past Surgical History:   Procedure Laterality Date    CARDIOVASCULAR STRESS TEST  2011    Patient activated transmissions notable for an episode suggestive of re-entry  SVT w/ heart rates up to 190's. Remaining transmissions appeared consistent w/ either sinus rhythm or sinus tachycardia. [address] [facility], , 2004    X 3    ENDOMETRIAL ABLATION  13    PO FRANKLIN      TRANSTHORACIC ECHOCARDIOGRAM  2011    LV was mildly dilated. Systolic function mildly reduced. EF 45%. Mild diffuse hypokinesis. Mild MR and TR.      TUBAL LIGATION  2004     Family History   Problem Relation Age of Onset    Hypertension Mother     Breast Cancer Mother 48    Diabetes Father     Heart Disease Father     High Cholesterol Father     Heart Surgery Father         Bypass/surgery     Social History     Tobacco Use    Smoking status: Former     Packs/day: 1.00     Years: 38.00     Pack years: 38.00     Types: Cigarettes     Quit date: 2023     Years since quittin.1    Smokeless tobacco: Never   Substance Use Topics    Alcohol use: Yes     Comment: rarely      Current Outpatient Medications   Medication Sig Dispense Refill pantoprazole (PROTONIX) 40 MG tablet Take 40 mg by mouth in the morning and at bedtime      acyclovir (ZOVIRAX) 800 MG tablet Take 1 tablet by mouth daily as needed (for cold sores) 30 tablet 0    nystatin (MYCOSTATIN) 592928 UNIT/GM cream Apply topically 2 times daily. 30 g 1    buPROPion (WELLBUTRIN XL) 150 MG extended release tablet TAKE 1 TABLET BY MOUTH IN THE MORNING 30 tablet 2    atorvastatin (LIPITOR) 40 MG tablet 1 tab daily 30 tablet 5    metoprolol succinate (TOPROL XL) 25 MG extended release tablet TAKE 1 TABLET BY MOUTH EVERY DAY 90 tablet 3    lisinopril (PRINIVIL;ZESTRIL) 2.5 MG tablet TAKE 1 TABLET BY MOUTH EVERY DAY 90 tablet 3     No current facility-administered medications for this visit. No Known Allergies  Health Maintenance   Topic Date Due    HIV screen  Never done    Hepatitis C screen  Never done    Cervical cancer screen  Never done    DTaP/Tdap/Td vaccine (1 - Tdap) 08/15/2000    Shingles vaccine (1 of 2) Never done    Low dose CT lung screening  Never done    COVID-19 Vaccine (3 - Booster for Moderna series) 02/12/2022    Flu vaccine (1) 08/01/2022    Breast cancer screen  07/05/2023    Lipids  10/05/2023    Depression Monitoring  01/18/2024    Colorectal Cancer Screen  11/25/2029    Hepatitis A vaccine  Aged Out    Hib vaccine  Aged Out    Meningococcal (ACWY) vaccine  Aged Out    Pneumococcal 0-64 years Vaccine  Aged Out       Subjective:  General:   No fever, no chills, No fatigue or weight loss  Pulmonary:    No dyspnea, no wheezing  Cardiac:    Denies recent chest pain,   GI:     No nausea or vomiting, no abdominal pain  Neuro:    No dizziness or light headedness,   Musculoskeletal:  No recent active issues  Extremities:   No edema, no obvious claudication       Objective:  General:   Well developed, well nourished  Lungs:   Clear to auscultation  Heart:    Normal S1 S2, Slight murmur.  no rubs, no gallops  Abdomen:   Soft, non tender, no organomegalies, positive bowel sounds  Extremities:   No edema, no cyanosis, good peripheral pulses  Neurological:   Awake, alert, oriented. No obvious focal deficits  Musculoskelatal:  No obvious deformities   /78   Pulse 87   Ht 5' 1\" (1.549 m)   Wt 166 lb (75.3 kg)   BMI 31.37 kg/m²     Assessment:      Diagnosis Orders   1. Dilated cardiomyopathy ([address]        2. Primary hypertension        3. Familial hypercholesterolemia        As above  Cardiac seems stable       Plan:  No follow-ups on file. As above  Continue risk factor modification and medical management  Thank you for allowing me to participate in the care of your patient. Please don't hesitate to contact me regarding any further issues related to the patient care    Orders Placed:  No orders of the defined types were placed in this encounter. Prescribed:  No orders of the defined types were placed in this encounter. Discussed use, benefit, and side effects of prescribed medications. All patient questions answered. Pt voicedunderstanding. Instructed to continue current medications, diet and exercise. Continue risk factor modification and medical management. Patient agreed with treatment plan. Follow up as directed.     Electronically signedby Mae Florentino MD on 2/22/2023 at 12:25 PM

## 2023-02-24 RX ORDER — ATORVASTATIN CALCIUM 40 MG/1
TABLET, FILM COATED ORAL
Qty: 90 TABLET | Refills: 0 | Status: SHIPPED | OUTPATIENT
Start: 2023-02-24 | End: 2023-04-12 | Stop reason: DRUGHIGH

## 2023-03-02 RX ORDER — BUPROPION HYDROCHLORIDE 150 MG/1
TABLET ORAL
Qty: 90 TABLET | Refills: 0 | Status: SHIPPED | OUTPATIENT
Start: 2023-03-02

## 2023-04-04 LAB
ALT SERPL-CCNC: 23 U/L (ref 5–40)
AST SERPL-CCNC: 21 U/L (ref 9–40)
CHOLESTEROL/HDL RATIO: 3.5 RATIO
CHOLESTEROL: 229 MG/DL
HDLC SERPL-MCNC: 65 MG/DL
LDL CHOLESTEROL CALCULATED: 137 MG/DL
LDL/HDL RATIO: 2.1 RATIO
TRIGL SERPL-MCNC: 133 MG/DL
VLDLC SERPL CALC-MCNC: 27 MG/DL

## 2023-06-30 ENCOUNTER — OFFICE VISIT (OUTPATIENT)
Dept: FAMILY MEDICINE CLINIC | Age: 53
End: 2023-06-30

## 2023-06-30 VITALS
RESPIRATION RATE: 12 BRPM | DIASTOLIC BLOOD PRESSURE: 74 MMHG | HEIGHT: 61 IN | SYSTOLIC BLOOD PRESSURE: 128 MMHG | HEART RATE: 72 BPM | WEIGHT: 161.8 LBS | BODY MASS INDEX: 30.55 KG/M2

## 2023-06-30 DIAGNOSIS — E78.5 HYPERLIPIDEMIA, UNSPECIFIED HYPERLIPIDEMIA TYPE: Chronic | ICD-10-CM

## 2023-06-30 DIAGNOSIS — I47.1 SVT (SUPRAVENTRICULAR TACHYCARDIA) (HCC): Chronic | ICD-10-CM

## 2023-06-30 DIAGNOSIS — I10 PRIMARY HYPERTENSION: ICD-10-CM

## 2023-06-30 DIAGNOSIS — Z12.31 ENCOUNTER FOR SCREENING MAMMOGRAM FOR MALIGNANT NEOPLASM OF BREAST: ICD-10-CM

## 2023-06-30 PROCEDURE — 99213 OFFICE O/P EST LOW 20 MIN: CPT | Performed by: FAMILY MEDICINE

## 2023-06-30 RX ORDER — FAMOTIDINE 20 MG/1
20 TABLET, FILM COATED ORAL DAILY
COMMUNITY
End: 2023-06-30

## 2023-06-30 SDOH — ECONOMIC STABILITY: INCOME INSECURITY: HOW HARD IS IT FOR YOU TO PAY FOR THE VERY BASICS LIKE FOOD, HOUSING, MEDICAL CARE, AND HEATING?: NOT VERY HARD

## 2023-06-30 SDOH — ECONOMIC STABILITY: FOOD INSECURITY: WITHIN THE PAST 12 MONTHS, THE FOOD YOU BOUGHT JUST DIDN'T LAST AND YOU DIDN'T HAVE MONEY TO GET MORE.: NEVER TRUE

## 2023-06-30 SDOH — ECONOMIC STABILITY: FOOD INSECURITY: WITHIN THE PAST 12 MONTHS, YOU WORRIED THAT YOUR FOOD WOULD RUN OUT BEFORE YOU GOT MONEY TO BUY MORE.: NEVER TRUE

## 2023-06-30 SDOH — ECONOMIC STABILITY: HOUSING INSECURITY
IN THE LAST 12 MONTHS, WAS THERE A TIME WHEN YOU DID NOT HAVE A STEADY PLACE TO SLEEP OR SLEPT IN A SHELTER (INCLUDING NOW)?: NO

## 2023-06-30 ASSESSMENT — ENCOUNTER SYMPTOMS
VOMITING: 0
SHORTNESS OF BREATH: 0
DIARRHEA: 0
ABDOMINAL PAIN: 0
CHEST TIGHTNESS: 0
COUGH: 0
NAUSEA: 0
ORTHOPNEA: 0
EYES NEGATIVE: 1
BLURRED VISION: 0
CONSTIPATION: 0

## 2023-08-25 ENCOUNTER — HOSPITAL ENCOUNTER (OUTPATIENT)
Dept: WOMENS IMAGING | Age: 53
Discharge: HOME | End: 2023-08-25
Attending: FAMILY MEDICINE
Payer: COMMERCIAL

## 2023-08-25 VITALS — WEIGHT: 161 LBS | HEIGHT: 61 IN | BODY MASS INDEX: 30.4 KG/M2

## 2023-08-25 DIAGNOSIS — Z12.31 ENCOUNTER FOR SCREENING MAMMOGRAM FOR MALIGNANT NEOPLASM OF BREAST: ICD-10-CM

## 2023-08-25 PROCEDURE — 77063 BREAST TOMOSYNTHESIS BI: CPT

## 2023-09-08 LAB
ALT SERPL-CCNC: 15 U/L (ref 5–40)
AST SERPL-CCNC: 17 U/L (ref 9–40)
CHOLESTEROL/HDL RATIO: 4.2 RATIO
CHOLESTEROL: 208 MG/DL
HDLC SERPL-MCNC: 49 MG/DL
LDL CHOLESTEROL CALCULATED: 125 MG/DL
LDL/HDL RATIO: 2.6 RATIO
TRIGL SERPL-MCNC: 168 MG/DL
VLDLC SERPL CALC-MCNC: 34 MG/DL

## 2024-01-12 RX ORDER — ATORVASTATIN CALCIUM 80 MG/1
80 TABLET, FILM COATED ORAL DAILY
Qty: 90 TABLET | Refills: 0 | Status: SHIPPED | OUTPATIENT
Start: 2024-01-12

## 2024-01-12 NOTE — TELEPHONE ENCOUNTER
The pharmacy is requesting a refill of the below medication which has been pended for you:     Requested Prescriptions     Pending Prescriptions Disp Refills    atorvastatin (LIPITOR) 80 MG tablet 90 tablet 1     Sig: Take 1 tablet by mouth daily       Last Appointment Date: 6/30/2023  Next Appointment Date: Visit date not found    No Known Allergies

## 2024-01-19 RX ORDER — ATORVASTATIN CALCIUM 80 MG/1
80 TABLET, FILM COATED ORAL DAILY
Qty: 90 TABLET | Refills: 0 | Status: SHIPPED | OUTPATIENT
Start: 2024-01-19

## 2024-01-19 NOTE — TELEPHONE ENCOUNTER
Yaneth called requesting a refill of the below medication which has been pended for you:     Requested Prescriptions     Pending Prescriptions Disp Refills    atorvastatin (LIPITOR) 80 MG tablet 90 tablet 0     Sig: Take 1 tablet by mouth daily       Last Appointment Date: 6/30/2023  Next Appointment Date: Visit date not found    No Known Allergies  .    Pt informed that she needs an appointment.

## 2024-01-30 RX ORDER — METOPROLOL SUCCINATE 25 MG/1
25 TABLET, EXTENDED RELEASE ORAL DAILY
Qty: 30 TABLET | Refills: 0 | Status: SHIPPED | OUTPATIENT
Start: 2024-01-30 | End: 2024-02-01 | Stop reason: SDUPTHER

## 2024-01-30 RX ORDER — ATORVASTATIN CALCIUM 80 MG/1
80 TABLET, FILM COATED ORAL DAILY
Qty: 90 TABLET | Refills: 0 | Status: SHIPPED | OUTPATIENT
Start: 2024-01-30

## 2024-01-30 NOTE — TELEPHONE ENCOUNTER
Date of last visit:  6/30/2023  Date of next visit:  1/31/2024    Requested Prescriptions     Pending Prescriptions Disp Refills    atorvastatin (LIPITOR) 80 MG tablet [Pharmacy Med Name: Atorvastatin Calcium Oral Tablet 80 MG] 90 tablet 0     Sig: Take 1 tablet by mouth daily

## 2024-01-31 ENCOUNTER — OFFICE VISIT (OUTPATIENT)
Dept: FAMILY MEDICINE CLINIC | Age: 54
End: 2024-01-31

## 2024-01-31 VITALS
RESPIRATION RATE: 12 BRPM | BODY MASS INDEX: 30.21 KG/M2 | WEIGHT: 160 LBS | SYSTOLIC BLOOD PRESSURE: 124 MMHG | DIASTOLIC BLOOD PRESSURE: 70 MMHG | HEIGHT: 61 IN | HEART RATE: 68 BPM

## 2024-01-31 DIAGNOSIS — E78.5 HYPERLIPIDEMIA, UNSPECIFIED HYPERLIPIDEMIA TYPE: ICD-10-CM

## 2024-01-31 DIAGNOSIS — I10 PRIMARY HYPERTENSION: Primary | ICD-10-CM

## 2024-01-31 DIAGNOSIS — I47.10 SVT (SUPRAVENTRICULAR TACHYCARDIA): ICD-10-CM

## 2024-01-31 DIAGNOSIS — K21.9 GASTROESOPHAGEAL REFLUX DISEASE, UNSPECIFIED WHETHER ESOPHAGITIS PRESENT: ICD-10-CM

## 2024-01-31 DIAGNOSIS — E66.09 CLASS 1 OBESITY DUE TO EXCESS CALORIES WITH BODY MASS INDEX (BMI) OF 30.0 TO 30.9 IN ADULT, UNSPECIFIED WHETHER SERIOUS COMORBIDITY PRESENT: ICD-10-CM

## 2024-01-31 LAB
ABSOLUTE BASO #: 0.06 K/UL (ref 0–0.2)
ABSOLUTE EOS #: 0.13 K/UL (ref 0–0.5)
ABSOLUTE LYMPH #: 2.91 K/UL (ref 1–4)
ABSOLUTE MONO #: 0.59 K/UL (ref 0.2–1)
ABSOLUTE NEUT #: 3.01 K/UL (ref 1.5–7.5)
ALBUMIN SERPL-MCNC: 4.9 G/DL (ref 3.5–5.2)
ALK PHOSPHATASE: 88 U/L (ref 40–133)
ALT SERPL-CCNC: 21 U/L (ref 5–40)
ANION GAP SERPL CALCULATED.3IONS-SCNC: 10 MEQ/L (ref 7–16)
AST SERPL-CCNC: 18 U/L (ref 9–40)
BASOPHILS RELATIVE PERCENT: 0.9 %
BILIRUB SERPL-MCNC: 0.4 MG/DL
BUN BLDV-MCNC: 10 MG/DL (ref 6–20)
CALCIUM SERPL-MCNC: 10.4 MG/DL (ref 8.5–10.5)
CHLORIDE BLD-SCNC: 103 MEQ/L (ref 95–107)
CO2: 29 MEQ/L (ref 19–31)
CREAT SERPL-MCNC: 0.75 MG/DL (ref 0.6–1.3)
EGFR IF NONAFRICAN AMERICAN: 95 ML/MIN/1.73
EOSINOPHILS RELATIVE PERCENT: 1.9 %
GLUCOSE: 94 MG/DL (ref 70–99)
HCT VFR BLD CALC: 47.1 % (ref 34–45)
HEMOGLOBIN: 15.7 G/DL (ref 11.5–15.5)
LYMPHOCYTE %: 43 %
MAGNESIUM: 2 MG/DL (ref 1.6–2.6)
MCH RBC QN AUTO: 28.7 PG (ref 25–33)
MCHC RBC AUTO-ENTMCNC: 33.3 G/DL (ref 31–36)
MCV RBC AUTO: 86.1 FL (ref 80–99)
MONOCYTES # BLD: 8.7 %
NEUTROPHILS RELATIVE PERCENT: 44.6 %
PDW BLD-RTO: 13.5 % (ref 11.5–15)
PLATELETS: 271 K/UL (ref 130–400)
PMV BLD AUTO: 9.3 FL (ref 9.3–13)
POTASSIUM SERPL-SCNC: 4.9 MEQ/L (ref 3.5–5.4)
RBC: 5.47 M/UL (ref 3.8–5.4)
SODIUM BLD-SCNC: 142 MEQ/L (ref 133–146)
TOTAL PROTEIN: 6.9 G/DL (ref 6.1–8.3)
VITAMIN B-12: 561 PG/ML (ref 200–950)
WBC: 6.8 K/UL (ref 3.5–11)

## 2024-01-31 PROCEDURE — 99213 OFFICE O/P EST LOW 20 MIN: CPT | Performed by: FAMILY MEDICINE

## 2024-01-31 PROCEDURE — G8427 DOCREV CUR MEDS BY ELIG CLIN: HCPCS | Performed by: FAMILY MEDICINE

## 2024-01-31 PROCEDURE — 3017F COLORECTAL CA SCREEN DOC REV: CPT | Performed by: FAMILY MEDICINE

## 2024-01-31 PROCEDURE — G8417 CALC BMI ABV UP PARAM F/U: HCPCS | Performed by: FAMILY MEDICINE

## 2024-01-31 ASSESSMENT — PATIENT HEALTH QUESTIONNAIRE - PHQ9
SUM OF ALL RESPONSES TO PHQ QUESTIONS 1-9: 0
1. LITTLE INTEREST OR PLEASURE IN DOING THINGS: 0
SUM OF ALL RESPONSES TO PHQ QUESTIONS 1-9: 0
SUM OF ALL RESPONSES TO PHQ9 QUESTIONS 1 & 2: 0
10. IF YOU CHECKED OFF ANY PROBLEMS, HOW DIFFICULT HAVE THESE PROBLEMS MADE IT FOR YOU TO DO YOUR WORK, TAKE CARE OF THINGS AT HOME, OR GET ALONG WITH OTHER PEOPLE: 0
8. MOVING OR SPEAKING SO SLOWLY THAT OTHER PEOPLE COULD HAVE NOTICED. OR THE OPPOSITE, BEING SO FIGETY OR RESTLESS THAT YOU HAVE BEEN MOVING AROUND A LOT MORE THAN USUAL: 0
2. FEELING DOWN, DEPRESSED OR HOPELESS: 0
SUM OF ALL RESPONSES TO PHQ QUESTIONS 1-9: 0
3. TROUBLE FALLING OR STAYING ASLEEP: 0
5. POOR APPETITE OR OVEREATING: 0
9. THOUGHTS THAT YOU WOULD BE BETTER OFF DEAD, OR OF HURTING YOURSELF: 0
SUM OF ALL RESPONSES TO PHQ QUESTIONS 1-9: 0
4. FEELING TIRED OR HAVING LITTLE ENERGY: 0
7. TROUBLE CONCENTRATING ON THINGS, SUCH AS READING THE NEWSPAPER OR WATCHING TELEVISION: 0

## 2024-01-31 ASSESSMENT — ENCOUNTER SYMPTOMS
DIARRHEA: 0
HOARSE VOICE: 0
COUGH: 0
SORE THROAT: 0
CHEST TIGHTNESS: 0
WATER BRASH: 0
ABDOMINAL PAIN: 0
CONSTIPATION: 0
SHORTNESS OF BREATH: 0
VOMITING: 0
EYES NEGATIVE: 1
NAUSEA: 0

## 2024-01-31 NOTE — PROGRESS NOTES
Date: 1/31/2024    Yaneth Ragland is a 53 y.o. female who presents today for:  Chief Complaint   Patient presents with    Discuss Labs  She is  wondering about using Monjauro for weight loss.        HPI:     Hypertension  This is a chronic problem. The current episode started more than 1 year ago. The problem is unchanged. The problem is controlled. Pertinent negatives include no chest pain, headaches, palpitations, peripheral edema, PND or shortness of breath. Risk factors for coronary artery disease include dyslipidemia, obesity and post-menopausal state. Past treatments include beta blockers and ACE inhibitors. The current treatment provides significant improvement.   Hyperlipidemia  This is a chronic problem. The current episode started more than 1 year ago. The problem is controlled. Pertinent negatives include no chest pain, focal sensory loss, focal weakness, leg pain, myalgias or shortness of breath. Current antihyperlipidemic treatment includes statins (Fish oil). The current treatment provides significant improvement of lipids. Risk factors for coronary artery disease include dyslipidemia, hypertension, obesity and post-menopausal.   Gastroesophageal Reflux  She reports no abdominal pain, no chest pain, no coughing, no hoarse voice, no nausea, no sore throat, no tooth decay or no water brash. This is a chronic problem. The current episode started more than 1 year ago. The problem occurs rarely. The problem has been unchanged. Pertinent negatives include no fatigue. She has tried a PPI for the symptoms. The treatment provided significant relief.       has a current medication list which includes the following prescription(s): metoprolol succinate, atorvastatin, lisinopril, pantoprazole, acyclovir, and nystatin.    No Known Allergies    Social History     Tobacco Use    Smoking status: Every Day     Current packs/day: 0.00     Average packs/day: 1 pack/day for 39.0 years (39.0 ttl pk-yrs)     Types:

## 2024-02-01 RX ORDER — METOPROLOL SUCCINATE 25 MG/1
25 TABLET, EXTENDED RELEASE ORAL DAILY
Qty: 90 TABLET | Refills: 3 | Status: SHIPPED | OUTPATIENT
Start: 2024-02-01

## 2024-02-01 RX ORDER — LISINOPRIL 2.5 MG/1
2.5 TABLET ORAL DAILY
Qty: 90 TABLET | Refills: 3 | Status: SHIPPED | OUTPATIENT
Start: 2024-02-01

## 2024-02-12 ENCOUNTER — OFFICE VISIT (OUTPATIENT)
Dept: FAMILY MEDICINE CLINIC | Age: 54
End: 2024-02-12

## 2024-02-12 VITALS
WEIGHT: 159.6 LBS | BODY MASS INDEX: 30.13 KG/M2 | RESPIRATION RATE: 12 BRPM | SYSTOLIC BLOOD PRESSURE: 120 MMHG | HEIGHT: 61 IN | HEART RATE: 72 BPM | DIASTOLIC BLOOD PRESSURE: 70 MMHG

## 2024-02-12 DIAGNOSIS — J30.9 ALLERGIC RHINITIS, UNSPECIFIED SEASONALITY, UNSPECIFIED TRIGGER: ICD-10-CM

## 2024-02-12 DIAGNOSIS — E66.9 OBESITY WITHOUT SERIOUS COMORBIDITY, UNSPECIFIED CLASSIFICATION, UNSPECIFIED OBESITY TYPE: ICD-10-CM

## 2024-02-12 DIAGNOSIS — E78.5 HYPERLIPIDEMIA, UNSPECIFIED HYPERLIPIDEMIA TYPE: ICD-10-CM

## 2024-02-12 DIAGNOSIS — I10 PRIMARY HYPERTENSION: Primary | ICD-10-CM

## 2024-02-12 PROCEDURE — 3017F COLORECTAL CA SCREEN DOC REV: CPT | Performed by: FAMILY MEDICINE

## 2024-02-12 PROCEDURE — G8427 DOCREV CUR MEDS BY ELIG CLIN: HCPCS | Performed by: FAMILY MEDICINE

## 2024-02-12 PROCEDURE — 99213 OFFICE O/P EST LOW 20 MIN: CPT | Performed by: FAMILY MEDICINE

## 2024-02-12 PROCEDURE — G8417 CALC BMI ABV UP PARAM F/U: HCPCS | Performed by: FAMILY MEDICINE

## 2024-02-12 RX ORDER — FLUTICASONE PROPIONATE 50 MCG
2 SPRAY, SUSPENSION (ML) NASAL DAILY
Qty: 16 G | Refills: 5 | Status: SHIPPED | OUTPATIENT
Start: 2024-02-12

## 2024-02-12 RX ORDER — TIRZEPATIDE 2.5 MG/.5ML
2.5 INJECTION, SOLUTION SUBCUTANEOUS WEEKLY
Qty: 4 EACH | Refills: 0 | Status: SHIPPED | OUTPATIENT
Start: 2024-02-12 | End: 2024-03-13 | Stop reason: DRUGHIGH

## 2024-02-12 NOTE — PROGRESS NOTES
Date: 2024    Yaneth Ragland is a 53 y.o. female who presents today for:  Chief Complaint   Patient presents with    Discuss Medications     Mounjaro  She is having allergy symptoms with the post nasal drainage, congestion, sinus pressure. She tried Zyrtec and di not help. Now she is taking Claritin and it work better than Zyrtec. She is also using a humidifier.        HPI:     HPI    has a current medication list which includes the following prescription(s): fluticasone, mounjaro, lisinopril, metoprolol succinate, atorvastatin, pantoprazole, acyclovir, and nystatin.    No Known Allergies    Social History     Tobacco Use    Smoking status: Every Day     Current packs/day: 0.00     Average packs/day: 1 pack/day for 39.0 years (39.0 ttl pk-yrs)     Types: Cigarettes     Start date: 1984     Last attempt to quit: 2023     Years since quittin.1    Smokeless tobacco: Never   Vaping Use    Vaping Use: Never used   Substance Use Topics    Alcohol use: Yes     Comment: rarely    Drug use: No       Past Medical History:   Diagnosis Date    Depression     DUB (dysfunctional uterine bleeding) 2013    Hyperlipidemia     Nicotine dependence     Palpitations     Snores     SVT (supraventricular tachycardia)        Past Surgical History:   Procedure Laterality Date    CARDIOVASCULAR STRESS TEST  2011    Patient activated transmissions notable for an episode suggestive of re-entry  SVT w/ heart rates up to 190's. Remaining transmissions appeared consistent w/ either sinus rhythm or sinus tachycardia.     SECTION  , , 2004    X 3    ENDOMETRIAL ABLATION  13    PO FRANKLIN      TRANSTHORACIC ECHOCARDIOGRAM  2011    LV was mildly dilated. Systolic function mildly reduced. EF 45%. Mild diffuse hypokinesis. Mild MR and TR.     TUBAL LIGATION  2004       Family History   Problem Relation Age of Onset    Hypertension Mother     Breast Cancer Mother 50    Diabetes Father     Heart

## 2024-02-22 ENCOUNTER — OFFICE VISIT (OUTPATIENT)
Dept: CARDIOLOGY CLINIC | Age: 54
End: 2024-02-22

## 2024-02-22 VITALS
DIASTOLIC BLOOD PRESSURE: 62 MMHG | BODY MASS INDEX: 29.27 KG/M2 | WEIGHT: 155 LBS | HEIGHT: 61 IN | SYSTOLIC BLOOD PRESSURE: 130 MMHG | HEART RATE: 84 BPM

## 2024-02-22 DIAGNOSIS — E78.01 FAMILIAL HYPERCHOLESTEROLEMIA: ICD-10-CM

## 2024-02-22 DIAGNOSIS — I42.0 DILATED CARDIOMYOPATHY (HCC): Primary | ICD-10-CM

## 2024-02-22 DIAGNOSIS — I10 PRIMARY HYPERTENSION: ICD-10-CM

## 2024-02-22 PROCEDURE — 3075F SYST BP GE 130 - 139MM HG: CPT | Performed by: NUCLEAR MEDICINE

## 2024-02-22 PROCEDURE — 3078F DIAST BP <80 MM HG: CPT | Performed by: NUCLEAR MEDICINE

## 2024-02-22 PROCEDURE — 93000 ELECTROCARDIOGRAM COMPLETE: CPT | Performed by: NUCLEAR MEDICINE

## 2024-02-22 PROCEDURE — 99213 OFFICE O/P EST LOW 20 MIN: CPT | Performed by: NUCLEAR MEDICINE

## 2024-02-22 NOTE — PROGRESS NOTES
[facility] [place] [facility]  [facility] CARDIOLOGY  [address]  [address]  [facility] [address]  Dept: [phone]  Dept Fax: [phone]  Loc: [phone]    Visit Date: 2024    Yaneth Ragland is a 53 y.o. female who presents todayfor:  Chief Complaint   Patient presents with    Hypertension    Cardiomyopathy   Known improved CMP   No chest pain  No changes in breathing  No dizziness  No syncope  BP is stable  She is active       HPI:  HPI  Past Medical History:   Diagnosis Date    Depression     DUB (dysfunctional uterine bleeding) 2013    Hyperlipidemia     Nicotine dependence     Palpitations     Snores     SVT (supraventricular tachycardia)       Past Surgical History:   Procedure Laterality Date    CARDIOVASCULAR STRESS TEST  2011    Patient activated transmissions notable for an episode suggestive of re-entry  SVT w/ heart rates up to 190's. Remaining transmissions appeared consistent w/ either sinus rhythm or sinus tachycardia.     SECTION  , , 2004    X 3    ENDOMETRIAL ABLATION  13    PO FRANKLIN      TRANSTHORACIC ECHOCARDIOGRAM  2011    LV was mildly dilated. Systolic function mildly reduced. EF 45%. Mild diffuse hypokinesis. Mild MR and TR.     TUBAL LIGATION       Family History   Problem Relation Age of Onset    Hypertension Mother     Breast Cancer Mother 50    Diabetes Father     Heart Disease Father     High Cholesterol Father     Heart Surgery Father         Bypass/surgery     Social History     Tobacco Use    Smoking status: Every Day     Current packs/day: 0.00     Average packs/day: 1 pack/day for 39.0 years (39.0 ttl pk-yrs)     Types: Cigarettes     Start date: 1984     Last attempt to quit: 2023     Years since quittin.1    Smokeless tobacco: Never   Substance Use Topics    Alcohol use: Yes     Comment: rarely      Current Outpatient Medications   Medication Sig Dispense Refill    fluticasone (FLONASE)

## 2024-03-13 ENCOUNTER — OFFICE VISIT (OUTPATIENT)
Dept: FAMILY MEDICINE CLINIC | Age: 54
End: 2024-03-13

## 2024-03-13 VITALS
HEIGHT: 61 IN | RESPIRATION RATE: 16 BRPM | SYSTOLIC BLOOD PRESSURE: 136 MMHG | HEART RATE: 80 BPM | DIASTOLIC BLOOD PRESSURE: 84 MMHG | WEIGHT: 150.8 LBS | BODY MASS INDEX: 28.47 KG/M2

## 2024-03-13 DIAGNOSIS — J30.9 ALLERGIC RHINITIS, UNSPECIFIED SEASONALITY, UNSPECIFIED TRIGGER: ICD-10-CM

## 2024-03-13 DIAGNOSIS — E78.5 HYPERLIPIDEMIA, UNSPECIFIED HYPERLIPIDEMIA TYPE: ICD-10-CM

## 2024-03-13 DIAGNOSIS — Z87.891 SMOKING HISTORY: ICD-10-CM

## 2024-03-13 DIAGNOSIS — E66.9 OBESITY WITHOUT SERIOUS COMORBIDITY, UNSPECIFIED CLASSIFICATION, UNSPECIFIED OBESITY TYPE: ICD-10-CM

## 2024-03-13 DIAGNOSIS — I10 PRIMARY HYPERTENSION: Primary | ICD-10-CM

## 2024-03-13 LAB
ALT SERPL-CCNC: 17 U/L (ref 5–40)
AST SERPL-CCNC: 18 U/L (ref 9–40)
CHOLESTEROL/HDL RATIO: 3.7 RATIO
CHOLESTEROL: 151 MG/DL
HDLC SERPL-MCNC: 41 MG/DL
LDL CHOLESTEROL CALCULATED: 85 MG/DL
LDL/HDL RATIO: 2.1 RATIO
TRIGL SERPL-MCNC: 123 MG/DL
VLDLC SERPL CALC-MCNC: 25 MG/DL

## 2024-03-13 PROCEDURE — G8417 CALC BMI ABV UP PARAM F/U: HCPCS | Performed by: FAMILY MEDICINE

## 2024-03-13 PROCEDURE — G8427 DOCREV CUR MEDS BY ELIG CLIN: HCPCS | Performed by: FAMILY MEDICINE

## 2024-03-13 PROCEDURE — 3017F COLORECTAL CA SCREEN DOC REV: CPT | Performed by: FAMILY MEDICINE

## 2024-03-13 PROCEDURE — 99213 OFFICE O/P EST LOW 20 MIN: CPT | Performed by: FAMILY MEDICINE

## 2024-03-13 RX ORDER — TIRZEPATIDE 5 MG/.5ML
5 INJECTION, SOLUTION SUBCUTANEOUS WEEKLY
Qty: 4 ADJUSTABLE DOSE PRE-FILLED PEN SYRINGE | Refills: 0 | Status: SHIPPED | OUTPATIENT
Start: 2024-03-13

## 2024-03-13 NOTE — PROGRESS NOTES
Date: 3/13/2024    Yaneth Ragland is a 54 y.o. female who presents today for:  Chief Complaint   Patient presents with    1 Month Follow-Up    Allergic Rhinitis   She goes to Dr Jain for her GYN       HPI:     HPI    has a current medication list which includes the following prescription(s): mounjaro, fluticasone, lisinopril, metoprolol succinate, atorvastatin, pantoprazole, acyclovir, and nystatin.    No Known Allergies    Social History     Tobacco Use    Smoking status: Every Day     Current packs/day: 0.00     Average packs/day: 1 pack/day for 39.0 years (39.0 ttl pk-yrs)     Types: Cigarettes     Start date: 1984     Last attempt to quit: 2023     Years since quittin.1    Smokeless tobacco: Never   Vaping Use    Vaping Use: Never used   Substance Use Topics    Alcohol use: Yes     Comment: rarely    Drug use: No       Past Medical History:   Diagnosis Date    Depression     DUB (dysfunctional uterine bleeding) 2013    Hyperlipidemia     Nicotine dependence     Palpitations     Snores     SVT (supraventricular tachycardia)        Past Surgical History:   Procedure Laterality Date    CARDIOVASCULAR STRESS TEST  2011    Patient activated transmissions notable for an episode suggestive of re-entry  SVT w/ heart rates up to 190's. Remaining transmissions appeared consistent w/ either sinus rhythm or sinus tachycardia.     SECTION  , , 2004    X 3    ENDOMETRIAL ABLATION  13    PO FRANKLIN      TRANSTHORACIC ECHOCARDIOGRAM  2011    LV was mildly dilated. Systolic function mildly reduced. EF 45%. Mild diffuse hypokinesis. Mild MR and TR.     TUBAL LIGATION  2004       Family History   Problem Relation Age of Onset    Hypertension Mother     Breast Cancer Mother 50    Diabetes Father     Heart Disease Father     High Cholesterol Father     Heart Surgery Father         Bypass/surgery     Subjective:     Review of Systems   Constitutional:  Negative for activity

## 2024-03-19 ASSESSMENT — ENCOUNTER SYMPTOMS: RHINORRHEA: 1

## 2024-03-22 ENCOUNTER — TELEPHONE (OUTPATIENT)
Dept: FAMILY MEDICINE CLINIC | Age: 54
End: 2024-03-22

## 2024-03-22 NOTE — TELEPHONE ENCOUNTER
Pt is scheduled for a CT Lung Screen at [facility] on 3-28-24 at 210 pm and will need to arrive [facility] at 140 pm.     If pt needs to reschedule she can call [phone].     MOM for pt to CB.

## 2024-03-28 ENCOUNTER — HOSPITAL ENCOUNTER (OUTPATIENT)
Dept: CT IMAGING | Age: 54
Discharge: HOME | End: 2024-03-28
Attending: FAMILY MEDICINE
Payer: COMMERCIAL

## 2024-03-28 DIAGNOSIS — Z87.891 SMOKING HISTORY: ICD-10-CM

## 2024-03-28 PROCEDURE — 71271 CT THORAX LUNG CANCER SCR C-: CPT

## 2024-03-29 ENCOUNTER — TELEPHONE (OUTPATIENT)
Dept: FAMILY MEDICINE CLINIC | Age: 54
End: 2024-03-29

## 2024-03-29 NOTE — TELEPHONE ENCOUNTER
----- Message from Reinier Riley MD sent at 3/29/2024  7:04 AM EDT -----   Ct lung screen ok as couple  very small spots and per scan repeat  in one year    Please call

## 2024-04-09 ENCOUNTER — TELEPHONE (OUTPATIENT)
Dept: FAMILY MEDICINE CLINIC | Age: 54
End: 2024-04-09

## 2024-04-09 DIAGNOSIS — E66.9 OBESITY WITHOUT SERIOUS COMORBIDITY, UNSPECIFIED CLASSIFICATION, UNSPECIFIED OBESITY TYPE: ICD-10-CM

## 2024-04-09 RX ORDER — TIRZEPATIDE 5 MG/.5ML
5 INJECTION, SOLUTION SUBCUTANEOUS WEEKLY
Qty: 4 ADJUSTABLE DOSE PRE-FILLED PEN SYRINGE | Refills: 0 | Status: SHIPPED | OUTPATIENT
Start: 2024-04-09

## 2024-04-09 NOTE — TELEPHONE ENCOUNTER
Pt called req a refill for Mounjaro 5 mg once a week.    Pt stated she is staying on the 5 mg dosing.    Brenden

## 2024-04-09 NOTE — TELEPHONE ENCOUNTER
Yaneth called requesting a refill of the below medication which has been pended for you:     Requested Prescriptions     Signed Prescriptions Disp Refills    Tirzepatide (MOUNJARO) 5 MG/0.5ML SOPN SC injection 4 Adjustable Dose Pre-filled Pen Syringe 0     Sig: Inject 0.5 mLs into the skin once a week     Authorizing Provider: RADHA HAMILTON     Ordering User: IDA RAMOS       Last Appointment Date: 3/13/2024  Next Appointment Date: 4/19/2024    No Known Allergies  Per verbal order Dr Hamilton sent Rx to pharmacy.

## 2024-04-19 ENCOUNTER — OFFICE VISIT (OUTPATIENT)
Dept: FAMILY MEDICINE CLINIC | Age: 54
End: 2024-04-19

## 2024-04-19 VITALS
HEART RATE: 68 BPM | RESPIRATION RATE: 12 BRPM | DIASTOLIC BLOOD PRESSURE: 70 MMHG | SYSTOLIC BLOOD PRESSURE: 120 MMHG | WEIGHT: 143.8 LBS | BODY MASS INDEX: 27.15 KG/M2 | HEIGHT: 61 IN

## 2024-04-19 DIAGNOSIS — K21.9 GASTROESOPHAGEAL REFLUX DISEASE, UNSPECIFIED WHETHER ESOPHAGITIS PRESENT: ICD-10-CM

## 2024-04-19 DIAGNOSIS — I10 PRIMARY HYPERTENSION: Primary | ICD-10-CM

## 2024-04-19 DIAGNOSIS — E78.5 HYPERLIPIDEMIA, UNSPECIFIED HYPERLIPIDEMIA TYPE: ICD-10-CM

## 2024-04-19 PROCEDURE — G8417 CALC BMI ABV UP PARAM F/U: HCPCS | Performed by: FAMILY MEDICINE

## 2024-04-19 PROCEDURE — G8427 DOCREV CUR MEDS BY ELIG CLIN: HCPCS | Performed by: FAMILY MEDICINE

## 2024-04-19 PROCEDURE — 3017F COLORECTAL CA SCREEN DOC REV: CPT | Performed by: FAMILY MEDICINE

## 2024-04-19 PROCEDURE — 99213 OFFICE O/P EST LOW 20 MIN: CPT | Performed by: FAMILY MEDICINE

## 2024-04-19 NOTE — PROGRESS NOTES
Date: 2024    Yaneth Ragland is a 54 y.o. female who presents today for:  Chief Complaint   Patient presents with    1 Month Follow-Up    Hypertension  She quit smoking 4/15/24.       HPI:     Hypertension  This is a chronic problem. The current episode started more than 1 year ago. The problem is unchanged. The problem is controlled. Pertinent negatives include no chest pain, headaches, palpitations or shortness of breath. Risk factors for coronary artery disease include dyslipidemia and post-menopausal state. Past treatments include ACE inhibitors and beta blockers. The current treatment provides significant improvement. There are no compliance problems.        has a current medication list which includes the following prescription(s): mounjaro, fluticasone, lisinopril, metoprolol succinate, atorvastatin, pantoprazole, acyclovir, and nystatin.    No Known Allergies    Social History     Tobacco Use    Smoking status: Every Day     Current packs/day: 0.00     Average packs/day: 1 pack/day for 39.0 years (39.0 ttl pk-yrs)     Types: Cigarettes     Start date: 1984     Last attempt to quit: 2023     Years since quittin.2    Smokeless tobacco: Never   Vaping Use    Vaping Use: Never used   Substance Use Topics    Alcohol use: Yes     Comment: rarely    Drug use: No       Past Medical History:   Diagnosis Date    Depression     DUB (dysfunctional uterine bleeding) 2013    Hyperlipidemia     Nicotine dependence     Palpitations     Snores     SVT (supraventricular tachycardia) ([facility])        Past Surgical History:   Procedure Laterality Date    CARDIOVASCULAR STRESS TEST  2011    Patient activated transmissions notable for an episode suggestive of re-entry  SVT w/ heart rates up to 190's. Remaining transmissions appeared consistent w/ either sinus rhythm or sinus tachycardia.     SECTION  , , 2004    X 3    ENDOMETRIAL ABLATION  13    NOVASURE    LEEP      TRANSTHORACIC

## 2024-04-22 DIAGNOSIS — E66.9 OBESITY WITHOUT SERIOUS COMORBIDITY, UNSPECIFIED CLASSIFICATION, UNSPECIFIED OBESITY TYPE: ICD-10-CM

## 2024-04-22 RX ORDER — TIRZEPATIDE 5 MG/.5ML
5 INJECTION, SOLUTION SUBCUTANEOUS WEEKLY
Qty: 4 ADJUSTABLE DOSE PRE-FILLED PEN SYRINGE | Refills: 1 | Status: SHIPPED | OUTPATIENT
Start: 2024-04-22

## 2024-04-22 NOTE — TELEPHONE ENCOUNTER
The pharmacy is  requesting a refill of the below medication which has been pended for you:     Requested Prescriptions     Pending Prescriptions Disp Refills    Tirzepatide (MOUNJARO) 5 MG/0.5ML SOPN SC injection 4 Adjustable Dose Pre-filled Pen Syringe 0     Sig: Inject 0.5 mLs into the skin once a week       Last Appointment Date: 4/19/2024  Next Appointment Date: 8/19/2024    No Known Allergies

## 2024-05-01 DIAGNOSIS — E66.9 OBESITY WITHOUT SERIOUS COMORBIDITY, UNSPECIFIED CLASSIFICATION, UNSPECIFIED OBESITY TYPE: ICD-10-CM

## 2024-05-01 RX ORDER — TIRZEPATIDE 5 MG/.5ML
5 INJECTION, SOLUTION SUBCUTANEOUS WEEKLY
Qty: 4 ADJUSTABLE DOSE PRE-FILLED PEN SYRINGE | Refills: 2 | Status: SHIPPED | OUTPATIENT
Start: 2024-05-01

## 2024-05-01 NOTE — TELEPHONE ENCOUNTER
The pharmacy is  requesting a refill of the below medication which has been pended for you:     Requested Prescriptions     Pending Prescriptions Disp Refills    MOUNJARO 5 MG/0.5ML SOPN SC injection [Pharmacy Med Name: MOUNJARO PEN 5MG/0.5ML] 0 mL 5     Sig: INJECT THE CONTENTS OF ONE PEN  SUBCUTANEOUSLY WEEKLY AS  DIRECTED       Last Appointment Date: 4/19/2024  Next Appointment Date: 8/19/2024    No Known Allergies

## 2024-05-28 RX ORDER — ATORVASTATIN CALCIUM 80 MG/1
80 TABLET, FILM COATED ORAL DAILY
Qty: 90 TABLET | Refills: 0 | Status: SHIPPED | OUTPATIENT
Start: 2024-05-28

## 2024-05-28 NOTE — TELEPHONE ENCOUNTER
Date of last visit:  4/19/2024  Date of next visit:  8/19/2024    Requested Prescriptions     Pending Prescriptions Disp Refills    atorvastatin (LIPITOR) 80 MG tablet [Pharmacy Med Name: Atorvastatin Calcium 80 MG Oral Tablet] 90 tablet 3     Sig: TAKE 1 TABLET BY MOUTH DAILY

## 2024-07-12 ENCOUNTER — TELEPHONE (OUTPATIENT)
Dept: FAMILY MEDICINE CLINIC | Age: 54
End: 2024-07-12

## 2024-07-12 NOTE — TELEPHONE ENCOUNTER
Pt called she is having severe left ear pain and wanted to know if Joy could prescribe something, an antibotic?    Hernán    Please advise.

## 2024-08-12 DIAGNOSIS — E66.9 OBESITY WITHOUT SERIOUS COMORBIDITY, UNSPECIFIED CLASSIFICATION, UNSPECIFIED OBESITY TYPE: ICD-10-CM

## 2024-08-13 NOTE — TELEPHONE ENCOUNTER
The pharmacy is  requesting a refill of the below medication which has been pended for you:     Requested Prescriptions     Pending Prescriptions Disp Refills    Tirzepatide (MOUNJARO) 5 MG/0.5ML SOPN SC injection [Pharmacy Med Name: MOUNJARO PEN 5MG/0.5ML] 6 mL 1     Sig: INJECT THE CONTENTS OF ONE PEN  SUBCUTANEOUSLY WEEKLY AS  DIRECTED       Last Appointment Date: 4/19/2024  Next Appointment Date: 8/23/2024    No Known Allergies

## 2024-08-14 RX ORDER — TIRZEPATIDE 5 MG/.5ML
INJECTION, SOLUTION SUBCUTANEOUS
Qty: 3 ML | Refills: 0 | Status: SHIPPED | OUTPATIENT
Start: 2024-08-14

## 2024-08-21 SDOH — ECONOMIC STABILITY: FOOD INSECURITY: WITHIN THE PAST 12 MONTHS, THE FOOD YOU BOUGHT JUST DIDN'T LAST AND YOU DIDN'T HAVE MONEY TO GET MORE.: NEVER TRUE

## 2024-08-21 SDOH — ECONOMIC STABILITY: TRANSPORTATION INSECURITY
IN THE PAST 12 MONTHS, HAS LACK OF TRANSPORTATION KEPT YOU FROM MEETINGS, WORK, OR FROM GETTING THINGS NEEDED FOR DAILY LIVING?: NO

## 2024-08-21 SDOH — ECONOMIC STABILITY: FOOD INSECURITY: WITHIN THE PAST 12 MONTHS, YOU WORRIED THAT YOUR FOOD WOULD RUN OUT BEFORE YOU GOT MONEY TO BUY MORE.: NEVER TRUE

## 2024-08-21 SDOH — ECONOMIC STABILITY: INCOME INSECURITY: HOW HARD IS IT FOR YOU TO PAY FOR THE VERY BASICS LIKE FOOD, HOUSING, MEDICAL CARE, AND HEATING?: NOT HARD AT ALL

## 2024-08-23 ENCOUNTER — OFFICE VISIT (OUTPATIENT)
Dept: FAMILY MEDICINE CLINIC | Age: 54
End: 2024-08-23

## 2024-08-23 VITALS
WEIGHT: 130 LBS | HEART RATE: 68 BPM | SYSTOLIC BLOOD PRESSURE: 120 MMHG | BODY MASS INDEX: 24.55 KG/M2 | HEIGHT: 61 IN | DIASTOLIC BLOOD PRESSURE: 70 MMHG | RESPIRATION RATE: 12 BRPM

## 2024-08-23 DIAGNOSIS — Z87.891 SMOKING HISTORY: ICD-10-CM

## 2024-08-23 DIAGNOSIS — I10 PRIMARY HYPERTENSION: Primary | ICD-10-CM

## 2024-08-23 DIAGNOSIS — K21.9 GASTROESOPHAGEAL REFLUX DISEASE, UNSPECIFIED WHETHER ESOPHAGITIS PRESENT: ICD-10-CM

## 2024-08-23 DIAGNOSIS — E78.5 HYPERLIPIDEMIA, UNSPECIFIED HYPERLIPIDEMIA TYPE: ICD-10-CM

## 2024-08-23 ASSESSMENT — ENCOUNTER SYMPTOMS
BLURRED VISION: 0
COUGH: 0
DIARRHEA: 0
CONSTIPATION: 0
ABDOMINAL PAIN: 0
CHEST TIGHTNESS: 0
EYES NEGATIVE: 1
SHORTNESS OF BREATH: 0
VOMITING: 0
NAUSEA: 0

## 2024-08-23 NOTE — PROGRESS NOTES
Date: 2024    Yaneth Ragland is a 54 y.o. female who presents today for:  Chief Complaint   Patient presents with    Hyperlipidemia    Hypertension       HPI:     Hyperlipidemia  This is a chronic problem. The current episode started more than 1 year ago. The problem is controlled. Recent lipid tests were reviewed and are normal. Pertinent negatives include no chest pain, focal sensory loss, focal weakness, leg pain, myalgias or shortness of breath. Current antihyperlipidemic treatment includes statins, diet change and exercise. The current treatment provides significant improvement of lipids. There are no compliance problems.  Risk factors for coronary artery disease include dyslipidemia, hypertension and post-menopausal.   Hypertension  This is a chronic problem. The current episode started more than 1 year ago. The problem is unchanged. The problem is controlled. Pertinent negatives include no anxiety, blurred vision, chest pain, headaches, neck pain, palpitations, peripheral edema, PND or shortness of breath. Risk factors for coronary artery disease include dyslipidemia and post-menopausal state. Past treatments include beta blockers and ACE inhibitors. The current treatment provides significant improvement. There are no compliance problems.        has a current medication list which includes the following prescription(s): mounjaro, atorvastatin, fluticasone, lisinopril, metoprolol succinate, pantoprazole, acyclovir, and nystatin.    No Known Allergies    Social History     Tobacco Use    Smoking status: Every Day     Current packs/day: 0.00     Average packs/day: 1 pack/day for 39.0 years (39.0 ttl pk-yrs)     Types: Cigarettes     Start date: 1984     Last attempt to quit: 2023     Years since quittin.6    Smokeless tobacco: Never   Vaping Use    Vaping status: Never Used   Substance Use Topics    Alcohol use: Yes     Comment: rarely    Drug use: No       Past Medical History:   Diagnosis Date

## 2024-09-07 DIAGNOSIS — E66.9 OBESITY WITHOUT SERIOUS COMORBIDITY, UNSPECIFIED CLASSIFICATION, UNSPECIFIED OBESITY TYPE: ICD-10-CM

## 2024-09-09 RX ORDER — TIRZEPATIDE 5 MG/.5ML
INJECTION, SOLUTION SUBCUTANEOUS
Qty: 2 ML | Refills: 1 | Status: SHIPPED | OUTPATIENT
Start: 2024-09-09

## 2024-10-09 DIAGNOSIS — E66.9 OBESITY WITHOUT SERIOUS COMORBIDITY: ICD-10-CM

## 2024-10-10 NOTE — TELEPHONE ENCOUNTER
The pharmacy is requesting a refill of the below medication which has been pended for you:     Requested Prescriptions     Pending Prescriptions Disp Refills    MOUNJARO 5 MG/0.5ML SOPN SC injection [Pharmacy Med Name: MOUNJARO PEN 5MG/0.5ML] 4 mL 5     Sig: INJECT THE CONTENTS OF ONE PEN  SUBCUTANEOUSLY WEEKLY AS  DIRECTED       Last Appointment Date: 8/23/2024  Next Appointment Date: Visit date not found    No Known Allergies

## 2024-10-11 RX ORDER — TIRZEPATIDE 5 MG/.5ML
INJECTION, SOLUTION SUBCUTANEOUS
Qty: 4 ML | Refills: 0 | Status: SHIPPED | OUTPATIENT
Start: 2024-10-11

## 2024-11-15 ENCOUNTER — HOSPITAL ENCOUNTER (OUTPATIENT)
Dept: WOMENS IMAGING | Age: 54
Discharge: HOME | End: 2024-11-15
Payer: COMMERCIAL

## 2024-11-15 VITALS — BODY MASS INDEX: 24.56 KG/M2 | WEIGHT: 130 LBS

## 2024-11-15 DIAGNOSIS — Z12.31 VISIT FOR SCREENING MAMMOGRAM: ICD-10-CM

## 2024-11-15 PROCEDURE — 77063 BREAST TOMOSYNTHESIS BI: CPT

## 2024-11-22 RX ORDER — TIRZEPATIDE 5 MG/.5ML
INJECTION, SOLUTION SUBCUTANEOUS
Qty: 4 ML | Refills: 2 | Status: SHIPPED | OUTPATIENT
Start: 2024-11-22

## 2024-11-22 NOTE — TELEPHONE ENCOUNTER
Date of last visit:  8/23/2024  Date of next visit:  Visit date not found    Requested Prescriptions     Pending Prescriptions Disp Refills    MOUNJARO 5 MG/0.5ML SOAJ [Pharmacy Med Name: MOUNJARO PEN 5MG/0.5ML] 4 mL 5     Sig: INJECT THE CONTENTS OF ONE PEN  SUBCUTANEOUSLY WEEKLY AS  DIRECTED

## 2024-12-04 RX ORDER — LISINOPRIL 2.5 MG/1
2.5 TABLET ORAL DAILY
Qty: 90 TABLET | Refills: 0 | Status: SHIPPED | OUTPATIENT
Start: 2024-12-04

## 2024-12-08 DIAGNOSIS — Z86.19 HISTORY OF COLD SORES: ICD-10-CM

## 2024-12-09 ENCOUNTER — OFFICE VISIT (OUTPATIENT)
Dept: FAMILY MEDICINE CLINIC | Age: 54
End: 2024-12-09

## 2024-12-09 VITALS
HEART RATE: 72 BPM | RESPIRATION RATE: 12 BRPM | HEIGHT: 61 IN | SYSTOLIC BLOOD PRESSURE: 122 MMHG | WEIGHT: 124 LBS | DIASTOLIC BLOOD PRESSURE: 68 MMHG | BODY MASS INDEX: 23.41 KG/M2

## 2024-12-09 DIAGNOSIS — I10 PRIMARY HYPERTENSION: Primary | ICD-10-CM

## 2024-12-09 DIAGNOSIS — B00.1 COLD SORE: ICD-10-CM

## 2024-12-09 DIAGNOSIS — Z63.79 STRESS DUE TO ILLNESS OF FAMILY MEMBER: ICD-10-CM

## 2024-12-09 DIAGNOSIS — E78.5 HYPERLIPIDEMIA, UNSPECIFIED HYPERLIPIDEMIA TYPE: ICD-10-CM

## 2024-12-09 PROCEDURE — G8420 CALC BMI NORM PARAMETERS: HCPCS | Performed by: FAMILY MEDICINE

## 2024-12-09 PROCEDURE — 99213 OFFICE O/P EST LOW 20 MIN: CPT | Performed by: FAMILY MEDICINE

## 2024-12-09 PROCEDURE — 3017F COLORECTAL CA SCREEN DOC REV: CPT | Performed by: FAMILY MEDICINE

## 2024-12-09 PROCEDURE — G8427 DOCREV CUR MEDS BY ELIG CLIN: HCPCS | Performed by: FAMILY MEDICINE

## 2024-12-09 RX ORDER — ATORVASTATIN CALCIUM 80 MG/1
80 TABLET, FILM COATED ORAL DAILY
Qty: 90 TABLET | Refills: 0 | Status: SHIPPED | OUTPATIENT
Start: 2024-12-09

## 2024-12-09 RX ORDER — ACYCLOVIR 800 MG/1
800 TABLET ORAL DAILY PRN
Qty: 30 TABLET | Refills: 0 | Status: SHIPPED | OUTPATIENT
Start: 2024-12-09

## 2024-12-09 ASSESSMENT — ENCOUNTER SYMPTOMS
COUGH: 0
EYES NEGATIVE: 1
DIARRHEA: 0
ABDOMINAL PAIN: 0
CHEST TIGHTNESS: 0
SHORTNESS OF BREATH: 0
CONSTIPATION: 0

## 2024-12-09 NOTE — TELEPHONE ENCOUNTER
The pharmacy is  requesting a refill of the below medication which has been pended for you:     Requested Prescriptions     Pending Prescriptions Disp Refills    atorvastatin (LIPITOR) 80 MG tablet [Pharmacy Med Name: Atorvastatin Calcium 80 MG Oral Tablet] 90 tablet 1     Sig: TAKE 1 TABLET BY MOUTH ONCE  DAILY       Last Appointment Date: 8/23/2024  Next Appointment Date: 12/8/2024    No Known Allergies

## 2024-12-09 NOTE — PROGRESS NOTES
Date: 2024    Yaneth Ragland is a 54 y.o. female who presents today for:  Chief Complaint   Patient presents with    Eye Problem     Twitching for three days but is stopped now.  She has a cold sore on her right lower lip since yesterday. She had some os Acyclovir and she started them yesterday.        HPI:     HPI    has a current medication list which includes the following prescription(s): atorvastatin, acyclovir, lisinopril, metoprolol succinate, and pantoprazole.    No Known Allergies    Social History     Tobacco Use    Smoking status: Every Day     Current packs/day: 0.00     Average packs/day: 1 pack/day for 39.0 years (39.0 ttl pk-yrs)     Types: Cigarettes     Start date: 1984     Last attempt to quit: 2023     Years since quittin.9    Smokeless tobacco: Never   Vaping Use    Vaping status: Never Used   Substance Use Topics    Alcohol use: Yes     Comment: rarely    Drug use: No       Past Medical History:   Diagnosis Date    Depression     DUB (dysfunctional uterine bleeding) 2013    Hyperlipidemia     Nicotine dependence     Palpitations     Snores     SVT (supraventricular tachycardia) ([facility])        Past Surgical History:   Procedure Laterality Date    CARDIOVASCULAR STRESS TEST  2011    Patient activated transmissions notable for an episode suggestive of re-entry  SVT w/ heart rates up to 190's. Remaining transmissions appeared consistent w/ either sinus rhythm or sinus tachycardia.     SECTION  , , 2004    X 3    ENDOMETRIAL ABLATION  13    PO FRANKLIN      TRANSTHORACIC ECHOCARDIOGRAM  2011    LV was mildly dilated. Systolic function mildly reduced. EF 45%. Mild diffuse hypokinesis. Mild MR and TR.     TUBAL LIGATION  2004       Family History   Problem Relation Age of Onset    Hypertension Mother     Breast Cancer Mother 50    Diabetes Father     Heart Disease Father     High Cholesterol Father     Heart Surgery Father         Bypass/surgery

## 2024-12-09 NOTE — TELEPHONE ENCOUNTER
The pharmacy is  requesting a refill of the below medication which has been pended for you:     Requested Prescriptions     Pending Prescriptions Disp Refills    acyclovir (ZOVIRAX) 800 MG tablet 30 tablet 0     Sig: Take 1 tablet by mouth daily as needed (for cold sores)       Last Appointment Date: 8/23/2024  Next Appointment Date: 12/9/2024    No Known Allergies

## 2024-12-24 RX ORDER — METOPROLOL SUCCINATE 25 MG/1
25 TABLET, EXTENDED RELEASE ORAL DAILY
Qty: 90 TABLET | Refills: 0 | Status: SHIPPED | OUTPATIENT
Start: 2024-12-24

## 2025-02-10 RX ORDER — LISINOPRIL 2.5 MG/1
2.5 TABLET ORAL DAILY
Qty: 90 TABLET | Refills: 1 | Status: SHIPPED | OUTPATIENT
Start: 2025-02-10

## 2025-03-03 RX ORDER — METOPROLOL SUCCINATE 25 MG/1
25 TABLET, EXTENDED RELEASE ORAL DAILY
Qty: 90 TABLET | Refills: 3 | Status: SHIPPED | OUTPATIENT
Start: 2025-03-03

## 2025-06-18 ENCOUNTER — OFFICE VISIT (OUTPATIENT)
Dept: CARDIOLOGY CLINIC | Age: 55
End: 2025-06-18
Payer: COMMERCIAL

## 2025-06-18 VITALS
SYSTOLIC BLOOD PRESSURE: 100 MMHG | HEIGHT: 61 IN | BODY MASS INDEX: 22.66 KG/M2 | DIASTOLIC BLOOD PRESSURE: 60 MMHG | HEART RATE: 83 BPM | WEIGHT: 120 LBS

## 2025-06-18 DIAGNOSIS — E78.01 FAMILIAL HYPERCHOLESTEROLEMIA: ICD-10-CM

## 2025-06-18 DIAGNOSIS — I42.0 DILATED CARDIOMYOPATHY (HCC): Primary | ICD-10-CM

## 2025-06-18 DIAGNOSIS — I10 PRIMARY HYPERTENSION: ICD-10-CM

## 2025-06-18 PROCEDURE — 3017F COLORECTAL CA SCREEN DOC REV: CPT | Performed by: NUCLEAR MEDICINE

## 2025-06-18 PROCEDURE — 3078F DIAST BP <80 MM HG: CPT | Performed by: NUCLEAR MEDICINE

## 2025-06-18 PROCEDURE — G8427 DOCREV CUR MEDS BY ELIG CLIN: HCPCS | Performed by: NUCLEAR MEDICINE

## 2025-06-18 PROCEDURE — 93000 ELECTROCARDIOGRAM COMPLETE: CPT | Performed by: NUCLEAR MEDICINE

## 2025-06-18 PROCEDURE — 99213 OFFICE O/P EST LOW 20 MIN: CPT | Performed by: NUCLEAR MEDICINE

## 2025-06-18 PROCEDURE — 4004F PT TOBACCO SCREEN RCVD TLK: CPT | Performed by: NUCLEAR MEDICINE

## 2025-06-18 PROCEDURE — 3074F SYST BP LT 130 MM HG: CPT | Performed by: NUCLEAR MEDICINE

## 2025-06-18 PROCEDURE — G8420 CALC BMI NORM PARAMETERS: HCPCS | Performed by: NUCLEAR MEDICINE

## 2025-06-18 RX ORDER — METOPROLOL SUCCINATE 25 MG/1
25 TABLET, EXTENDED RELEASE ORAL DAILY
Qty: 90 TABLET | Refills: 3 | Status: SHIPPED | OUTPATIENT
Start: 2025-06-18

## 2025-06-18 RX ORDER — LISINOPRIL 2.5 MG/1
2.5 TABLET ORAL DAILY
Qty: 90 TABLET | Refills: 4 | Status: SHIPPED | OUTPATIENT
Start: 2025-06-18

## 2025-06-18 NOTE — PROGRESS NOTES
[facility] [place] [facility]  [facility]  [address]  [address]  [facility] [address]  Dept: [phone]  Dept Fax: [phone]  Loc: [phone]    Visit Date: 2025    Yaneth Ragland is a 55 y.o. female who presents todayfor:  Chief Complaint   Patient presents with    Follow-up    Hypertension    Hyperlipidemia   Know improved CMP   some chest pain   No changes in breathing  BP is stable  Lower today   No dizziness  No syncope  On statins for hyperlipidemia        HPI:  HPI  Past Medical History:   Diagnosis Date    Depression     DUB (dysfunctional uterine bleeding) 2013    Hyperlipidemia     Nicotine dependence     Palpitations     Snores     SVT (supraventricular tachycardia)       Past Surgical History:   Procedure Laterality Date    CARDIOVASCULAR STRESS TEST  2011    Patient activated transmissions notable for an episode suggestive of re-entry  SVT w/ heart rates up to 190's. Remaining transmissions appeared consistent w/ either sinus rhythm or sinus tachycardia.     SECTION  , , 2004    X 3    ENDOMETRIAL ABLATION  13    PO FRANKLIN      TRANSTHORACIC ECHOCARDIOGRAM  2011    LV was mildly dilated. Systolic function mildly reduced. EF 45%. Mild diffuse hypokinesis. Mild MR and TR.     TUBAL LIGATION  2004     Family History   Problem Relation Age of Onset    Hypertension Mother     Breast Cancer Mother 50    Diabetes Father     Heart Disease Father     High Cholesterol Father     Heart Surgery Father         Bypass/surgery     Social History     Tobacco Use    Smoking status: Every Day     Current packs/day: 0.00     Average packs/day: 1 pack/day for 39.0 years (39.0 ttl pk-yrs)     Types: Cigarettes     Start date: 1984     Last attempt to quit: 2023     Years since quittin.4    Smokeless tobacco: Never   Substance Use Topics    Alcohol use: Yes     Comment: rarely      Current Outpatient Medications   Medication

## 2025-07-14 RX ORDER — ATORVASTATIN CALCIUM 80 MG/1
80 TABLET, FILM COATED ORAL DAILY
Qty: 90 TABLET | Refills: 0 | Status: SHIPPED | OUTPATIENT
Start: 2025-07-14

## 2025-07-14 NOTE — TELEPHONE ENCOUNTER
The pharmacy is requesting a refill of the below medication which has been pended for you:     Requested Prescriptions     Pending Prescriptions Disp Refills    atorvastatin (LIPITOR) 80 MG tablet [Pharmacy Med Name: Atorvastatin Calcium 80 MG Oral Tablet] 90 tablet 1     Sig: TAKE 1 TABLET BY MOUTH ONCE  DAILY       Last Appointment Date: 12/9/2024  Next Appointment Date: Visit date not found    No Known Allergies